# Patient Record
Sex: FEMALE | Race: WHITE | NOT HISPANIC OR LATINO | Employment: OTHER | URBAN - METROPOLITAN AREA
[De-identification: names, ages, dates, MRNs, and addresses within clinical notes are randomized per-mention and may not be internally consistent; named-entity substitution may affect disease eponyms.]

---

## 2017-03-28 ENCOUNTER — HOSPITAL ENCOUNTER (OUTPATIENT)
Dept: LAB | Facility: MEDICAL CENTER | Age: 62
End: 2017-03-28
Attending: FAMILY MEDICINE
Payer: COMMERCIAL

## 2017-03-28 LAB
ALBUMIN SERPL BCP-MCNC: 4 G/DL (ref 3.2–4.9)
ALBUMIN/GLOB SERPL: 1.3 G/DL
ALP SERPL-CCNC: 84 U/L (ref 30–99)
ALT SERPL-CCNC: 20 U/L (ref 2–50)
ANION GAP SERPL CALC-SCNC: 5 MMOL/L (ref 0–11.9)
AST SERPL-CCNC: 19 U/L (ref 12–45)
BILIRUB SERPL-MCNC: 0.4 MG/DL (ref 0.1–1.5)
BUN SERPL-MCNC: 17 MG/DL (ref 8–22)
CALCIUM SERPL-MCNC: 9.2 MG/DL (ref 8.5–10.5)
CHLORIDE SERPL-SCNC: 102 MMOL/L (ref 96–112)
CHOLEST SERPL-MCNC: 203 MG/DL (ref 100–199)
CO2 SERPL-SCNC: 30 MMOL/L (ref 20–33)
CREAT SERPL-MCNC: 0.73 MG/DL (ref 0.5–1.4)
ERYTHROCYTE [DISTWIDTH] IN BLOOD BY AUTOMATED COUNT: 48 FL (ref 35.9–50)
GLOBULIN SER CALC-MCNC: 3.1 G/DL (ref 1.9–3.5)
GLUCOSE SERPL-MCNC: 85 MG/DL (ref 65–99)
HCT VFR BLD AUTO: 44.8 % (ref 37–47)
HDLC SERPL-MCNC: 60 MG/DL
HGB BLD-MCNC: 15.2 G/DL (ref 12–16)
LDLC SERPL CALC-MCNC: 126 MG/DL
MCH RBC QN AUTO: 30.5 PG (ref 27–33)
MCHC RBC AUTO-ENTMCNC: 33.9 G/DL (ref 33.6–35)
MCV RBC AUTO: 90 FL (ref 81.4–97.8)
PLATELET # BLD AUTO: 293 K/UL (ref 164–446)
PMV BLD AUTO: 9.7 FL (ref 9–12.9)
POTASSIUM SERPL-SCNC: 3.8 MMOL/L (ref 3.6–5.5)
PROT SERPL-MCNC: 7.1 G/DL (ref 6–8.2)
RBC # BLD AUTO: 4.98 M/UL (ref 4.2–5.4)
SODIUM SERPL-SCNC: 137 MMOL/L (ref 135–145)
TRIGL SERPL-MCNC: 87 MG/DL (ref 0–149)
WBC # BLD AUTO: 7.6 K/UL (ref 4.8–10.8)

## 2017-03-28 PROCEDURE — 36415 COLL VENOUS BLD VENIPUNCTURE: CPT

## 2017-03-28 PROCEDURE — 80061 LIPID PANEL: CPT

## 2017-03-28 PROCEDURE — 80053 COMPREHEN METABOLIC PANEL: CPT

## 2017-03-28 PROCEDURE — 85027 COMPLETE CBC AUTOMATED: CPT

## 2017-06-27 ENCOUNTER — OFFICE VISIT (OUTPATIENT)
Dept: URGENT CARE | Facility: PHYSICIAN GROUP | Age: 62
End: 2017-06-27
Payer: COMMERCIAL

## 2017-06-27 VITALS
OXYGEN SATURATION: 98 % | RESPIRATION RATE: 18 BRPM | HEART RATE: 92 BPM | TEMPERATURE: 99.6 F | DIASTOLIC BLOOD PRESSURE: 90 MMHG | BODY MASS INDEX: 28.93 KG/M2 | WEIGHT: 180 LBS | SYSTOLIC BLOOD PRESSURE: 150 MMHG | HEIGHT: 66 IN

## 2017-06-27 DIAGNOSIS — H66.002 ACUTE SUPPURATIVE OTITIS MEDIA OF LEFT EAR WITHOUT SPONTANEOUS RUPTURE OF TYMPANIC MEMBRANE, RECURRENCE NOT SPECIFIED: ICD-10-CM

## 2017-06-27 PROCEDURE — 99204 OFFICE O/P NEW MOD 45 MIN: CPT | Performed by: FAMILY MEDICINE

## 2017-06-27 RX ORDER — AMOXICILLIN AND CLAVULANATE POTASSIUM 875; 125 MG/1; MG/1
1 TABLET, FILM COATED ORAL 2 TIMES DAILY
Qty: 14 TAB | Refills: 0 | Status: SHIPPED | OUTPATIENT
Start: 2017-06-27 | End: 2017-07-04

## 2017-06-27 ASSESSMENT — ENCOUNTER SYMPTOMS
DIZZINESS: 0
EYE PAIN: 0
CHILLS: 0
SORE THROAT: 0
NAUSEA: 0
HEADACHES: 1
VOMITING: 0
SHORTNESS OF BREATH: 0
MYALGIAS: 0
SINUS PRESSURE: 1
FEVER: 0

## 2017-06-27 NOTE — PROGRESS NOTES
"Subjective:      Wilda Oviedo is a 61 y.o. female who presents with Sinus Problem            Sinus Problem  This is a new problem. The current episode started in the past 7 days. The problem has been rapidly worsening since onset. The pain is severe. Associated symptoms include congestion, headaches and sinus pressure. Pertinent negatives include no chills, shortness of breath or sore throat.       Review of Systems   Constitutional: Negative for fever and chills.   HENT: Positive for congestion and sinus pressure. Negative for sore throat.    Eyes: Negative for pain.   Respiratory: Negative for shortness of breath.    Cardiovascular: Negative for chest pain.   Gastrointestinal: Negative for nausea and vomiting.   Genitourinary: Negative for hematuria.   Musculoskeletal: Negative for myalgias.   Skin: Negative for rash.   Neurological: Positive for headaches. Negative for dizziness.     PMH:  has a past medical history of Asthma.  MEDS:   Current outpatient prescriptions:   •  fluticasone-salmeterol (ADVAIR DISKUS) 100-50 MCG/DOSE AEROSOL POWDER, BREATH ACTIVATED, Inhale 1 Puff by mouth every 12 hours., Disp: , Rfl:   •  amoxicillin-clavulanate (AUGMENTIN) 875-125 MG Tab, Take 1 Tab by mouth 2 times a day for 7 days., Disp: 14 Tab, Rfl: 0  •  Multiple Vitamin (DAILY VITAMINS PO), Take  by mouth., Disp: , Rfl:   •  hydrocodone-acetaminophen (VICODIN) 5-500 MG TABS, Take 1-2 Tabs by mouth every four hours as needed for 20 doses., Disp: 20 Each, Rfl: 0  ALLERGIES: No Known Allergies  SURGHX:   Past Surgical History   Procedure Laterality Date   • Gyn surgery       tubes tied      SOCHX:  reports that she has never smoked. She has never used smokeless tobacco. She reports that she does not drink alcohol or use illicit drugs.  FH: family history is negative for Stroke and Heart Disease.      Objective:     /90 mmHg  Pulse 92  Temp(Src) 37.6 °C (99.6 °F)  Resp 18  Ht 1.664 m (5' 5.51\")  Wt 81.647 kg (180 lb) "  BMI 29.49 kg/m2  SpO2 98%     Physical Exam   Constitutional: She is oriented to person, place, and time. She appears well-developed and well-nourished. No distress.   HENT:   Head: Normocephalic and atraumatic.   Nose: Mucosal edema and rhinorrhea present. Right sinus exhibits maxillary sinus tenderness. Left sinus exhibits maxillary sinus tenderness.   Eyes: Conjunctivae and EOM are normal. Pupils are equal, round, and reactive to light.   Cardiovascular: Normal rate, regular rhythm, normal heart sounds and intact distal pulses.    No murmur heard.  Pulmonary/Chest: Effort normal and breath sounds normal. No respiratory distress.   Abdominal: Soft. Bowel sounds are normal. She exhibits no distension. There is no tenderness.   Musculoskeletal: Normal range of motion.   Neurological: She is alert and oriented to person, place, and time. She has normal reflexes. No sensory deficit.   Skin: Skin is warm and dry.   Psychiatric: She has a normal mood and affect. Her behavior is normal.               Assessment/Plan:     1. Acute suppurative otitis media of left ear without spontaneous rupture of tympanic membrane, recurrence not specified  Differential diagnosis, natural history, supportive care, and indications for immediate follow-up discussed.   - amoxicillin-clavulanate (AUGMENTIN) 875-125 MG Tab; Take 1 Tab by mouth 2 times a day for 7 days.  Dispense: 14 Tab; Refill: 0

## 2017-06-27 NOTE — MR AVS SNAPSHOT
"        Wilda Oviedo   2017 1:30 PM   Office Visit   MRN: [id number]    Department:  [facility]   Dept Phone:  [phone]    Description:  Female : 1955   Provider:  Jamey Mullen M.D.           Reason for Visit     Sinus Problem poss sinus infection, L ear pain, cough      Allergies as of 2017     No Known Allergies      You were diagnosed with     Acute suppurative otitis media of left ear without spontaneous rupture of tympanic membrane, recurrence not specified   [[id number]]         Vital Signs     Blood Pressure Pulse Temperature Respirations Height Weight    150/90 mmHg 92 37.6 °C (99.6 °F) 18 1.664 m (5' 5.51\") 81.647 kg (180 lb)    Body Mass Index Oxygen Saturation Smoking Status             29.49 kg/m2 98% Never Smoker          Basic Information     Date Of Birth Sex Race Ethnicity Preferred Language    1955 Female White Unknown English      Problem List              ICD-10-CM Priority Class Noted - Resolved    Hypothyroid E03.9   11/10/2009 - Present    Thyroid nodule E04.1   2010 - Present      Health Maintenance     Patient has no pending health maintenance at this time      Current Immunizations     Influenza Vaccine Quad Inj (Pf) 2015  6:36 AM, 2014      Below and/or attached are the medications your provider expects you to take. Review all of your home medications and newly ordered medications with your provider and/or pharmacist. Follow medication instructions as directed by your provider and/or pharmacist. Please keep your medication list with you and share with your provider. Update the information when medications are discontinued, doses are changed, or new medications (including over-the-counter products) are added; and carry medication information at all times in the event of emergency situations     Allergies:  No Known Allergies          Medications  Valid as of: 2017 -  1:57 PM    Generic Name Brand Name Tablet Size Instructions for " use    Amoxicillin-Pot Clavulanate (Tab) AUGMENTIN 875-125 MG Take 1 Tab by mouth 2 times a day for 7 days.        Fluticasone-Salmeterol (AEROSOL POWDER, BREATH ACTIVATED) ADVAIR 100-50 MCG/DOSE Inhale 1 Puff by mouth every 12 hours.        Hydrocodone-Acetaminophen (Tab) VICODIN 5-500 MG Take 1-2 Tabs by mouth every four hours as needed for 20 doses.        Multiple Vitamin   Take  by mouth.        .                 Medicines prescribed today were sent to:     [facility] [place] - [address] [facility]    [address] [place] [address]    Phone: [phone] Fax: [phone]    Open 24 Hours?: No      Medication refill instructions:       If your prescription bottle indicates you have medication refills left, it is not necessary to call your provider’s office. Please contact your pharmacy and they will refill your medication.    If your prescription bottle indicates you do not have any refills left, you may request refills at any time through one of the following ways: The online [employer] system (except Urgent Care), by calling your provider’s office, or by asking your pharmacy to contact your provider’s office with a refill request. Medication refills are processed only during regular business hours and may not be available until the next business day. Your provider may request additional information or to have a follow-up visit with you prior to refilling your medication.   *Please Note: Medication refills are assigned a new Rx number when refilled electronically. Your pharmacy may indicate that no refills were authorized even though a new prescription for the same medication is available at the pharmacy. Please request the medicine by name with the pharmacy before contacting your provider for a refill.        Instructions    Sinusitis, Adult  Sinusitis is redness, soreness, and inflammation of the paranasal sinuses. Paranasal sinuses are air pockets within the bones of your face. They are  located beneath your eyes, in the middle of your forehead, and above your eyes. In healthy paranasal sinuses, mucus is able to drain out, and air is able to circulate through them by way of your nose. However, when your paranasal sinuses are inflamed, mucus and air can become trapped. This can allow bacteria and other germs to grow and cause infection.  Sinusitis can develop quickly and last only a short time (acute) or continue over a long period (chronic). Sinusitis that lasts for more than 12 weeks is considered chronic.  CAUSES  Causes of sinusitis include:  · Allergies.  · Structural abnormalities, such as displacement of the cartilage that separates your nostrils (deviated septum), which can decrease the air flow through your nose and sinuses and affect sinus drainage.  · Functional abnormalities, such as when the small hairs (cilia) that line your sinuses and help remove mucus do not work properly or are not present.  SIGNS AND SYMPTOMS  Symptoms of acute and chronic sinusitis are the same. The primary symptoms are pain and pressure around the affected sinuses. Other symptoms include:  · Upper toothache.  · Earache.  · Headache.  · Bad breath.  · Decreased sense of smell and taste.  · A cough, which worsens when you are lying flat.  · Fatigue.  · Fever.  · Thick drainage from your nose, which often is green and may contain pus (purulent).  · Swelling and warmth over the affected sinuses.  DIAGNOSIS  Your health care provider will perform a physical exam. During your exam, your health care provider may perform any of the following to help determine if you have acute sinusitis or chronic sinusitis:  · Look in your nose for signs of abnormal growths in your nostrils (nasal polyps).  · Tap over the affected sinus to check for signs of infection.  · View the inside of your sinuses using an imaging device that has a light attached (endoscope).  If your health care provider suspects that you have chronic sinusitis,  one or more of the following tests may be recommended:  · Allergy tests.  · Nasal culture. A sample of mucus is taken from your nose, sent to a lab, and screened for bacteria.  · Nasal cytology. A sample of mucus is taken from your nose and examined by your health care provider to determine if your sinusitis is related to an allergy.  TREATMENT  Most cases of acute sinusitis are related to a viral infection and will resolve on their own within 10 days. Sometimes, medicines are prescribed to help relieve symptoms of both acute and chronic sinusitis. These may include pain medicines, decongestants, nasal steroid sprays, or saline sprays.  However, for sinusitis related to a bacterial infection, your health care provider will prescribe antibiotic medicines. These are medicines that will help kill the bacteria causing the infection.  Rarely, sinusitis is caused by a fungal infection. In these cases, your health care provider will prescribe antifungal medicine.  For some cases of chronic sinusitis, surgery is needed. Generally, these are cases in which sinusitis recurs more than 3 times per year, despite other treatments.  HOME CARE INSTRUCTIONS  · Drink plenty of water. Water helps thin the mucus so your sinuses can drain more easily.  · Use a humidifier.  · Inhale steam 3-4 times a day (for example, sit in the bathroom with the shower running).  · Apply a warm, moist washcloth to your face 3-4 times a day, or as directed by your health care provider.  · Use saline nasal sprays to help moisten and clean your sinuses.  · Take medicines only as directed by your health care provider.  · If you were prescribed either an antibiotic or antifungal medicine, finish it all even if you start to feel better.  SEEK IMMEDIATE MEDICAL CARE IF:  · You have increasing pain or severe headaches.  · You have nausea, vomiting, or drowsiness.  · You have swelling around your face.  · You have vision problems.  · You have a stiff  neck.  · You have difficulty breathing.     This information is not intended to replace advice given to you by your health care provider. Make sure you discuss any questions you have with your health care provider.     Document Released: 12/18/2006 Document Revised: 01/08/2016 Document Reviewed: 01/01/2013  [employer] Interactive Patient Education ©2016 Elsevier Inc.            [employer] Access Code: [email or web address]  Expires: 6/30/2017  4:38 AM    [employer]  A secure, online tool to manage your health information     [employer]’s [employer]® is a secure, online tool that connects you to your personalized health information from the privacy of your home -- day or night - making it very easy for you to manage your healthcare. Once the activation process is completed, you can even access your medical information using the [employer] maddie, which is available for free in the Apple Maddie store or Google Play store.     [employer] provides the following levels of access (as shown below):   My Chart Features   [facility] Primary Care Doctor [facility]  Specialists [facility]  Urgent  Care Non-[facility]  Primary Care  Doctor   Email your healthcare team securely and privately 24/7 X X X    Manage appointments: schedule your next appointment; view details of past/upcoming appointments X      Request prescription refills. X      View recent personal medical records, including lab and immunizations X X X X   View health record, including health history, allergies, medications X X X X   Read reports about your outpatient visits, procedures, consult and ER notes X X X X   See your discharge summary, which is a recap of your hospital and/or ER visit that includes your diagnosis, lab results, and care plan. X X       How to register for [employer]:  1. Go to  [email or web address].  2. Click on the Sign Up Now box, which takes you to the New Member Sign Up page. You will need to provide the following information:  a. Enter your [employer] Access Code exactly  as it appears at the top of this page. (You will not need to use this code after you’ve completed the sign-up process. If you do not sign up before the expiration date, you must request a new code.)   b. Enter your date of birth.   c. Enter your home email address.   d. Click Submit, and follow the next screen’s instructions.  3. Create a [employer]igo ID. This will be your [employer] login ID and cannot be changed, so think of one that is secure and easy to remember.  4. Create a Vigo password. You can change your password at any time.  5. Enter your Password Reset Question and Answer. This can be used at a later time if you forget your password.   6. Enter your e-mail address. This allows you to receive e-mail notifications when new information is available in [employer]igo.  7. Click Sign Up. You can now view your health information.    For assistance activating your [employer] account, call [phone]

## 2017-09-12 ENCOUNTER — HOSPITAL ENCOUNTER (OUTPATIENT)
Facility: MEDICAL CENTER | Age: 62
End: 2017-09-12
Payer: COMMERCIAL

## 2017-09-12 LAB
ALBUMIN SERPL BCP-MCNC: 4.4 G/DL (ref 3.2–4.9)
ALBUMIN/GLOB SERPL: 1.5 G/DL
ALP SERPL-CCNC: 69 U/L (ref 30–99)
ALT SERPL-CCNC: 17 U/L (ref 2–50)
ANION GAP SERPL CALC-SCNC: 6 MMOL/L (ref 0–11.9)
AST SERPL-CCNC: 18 U/L (ref 12–45)
BDY FAT % MEASURED: 44.5 %
BILIRUB SERPL-MCNC: 0.6 MG/DL (ref 0.1–1.5)
BP DIAS: 90 MMHG
BP SYS: 136 MMHG
BUN SERPL-MCNC: 17 MG/DL (ref 8–22)
CALCIUM SERPL-MCNC: 9.6 MG/DL (ref 8.5–10.5)
CHLORIDE SERPL-SCNC: 104 MMOL/L (ref 96–112)
CHOLEST SERPL-MCNC: 221 MG/DL (ref 100–199)
CO2 SERPL-SCNC: 28 MMOL/L (ref 20–33)
CREAT SERPL-MCNC: 0.71 MG/DL (ref 0.5–1.4)
DIABETES HTDIA: NO
EVENT NAME HTEVT: NORMAL
GFR SERPL CREATININE-BSD FRML MDRD: >60 ML/MIN/1.73 M 2
GLOBULIN SER CALC-MCNC: 3 G/DL (ref 1.9–3.5)
GLUCOSE SERPL-MCNC: 87 MG/DL (ref 65–99)
HDLC SERPL-MCNC: 60 MG/DL
HYPERTENSION HTHYP: NO
LDLC SERPL CALC-MCNC: 142 MG/DL
POTASSIUM SERPL-SCNC: 3.9 MMOL/L (ref 3.6–5.5)
PROT SERPL-MCNC: 7.4 G/DL (ref 6–8.2)
SCREENING LOC CITY HTCIT: NORMAL
SCREENING LOC STATE HTSTA: NORMAL
SCREENING LOCATION HTLOC: NORMAL
SODIUM SERPL-SCNC: 138 MMOL/L (ref 135–145)
SUBSCRIBER ID HTSID: NORMAL
TRIGL SERPL-MCNC: 95 MG/DL (ref 0–149)

## 2017-10-13 ENCOUNTER — HOSPITAL ENCOUNTER (OUTPATIENT)
Dept: RADIOLOGY | Facility: MEDICAL CENTER | Age: 62
End: 2017-10-13
Attending: FAMILY MEDICINE
Payer: COMMERCIAL

## 2017-10-13 DIAGNOSIS — Z12.31 VISIT FOR SCREENING MAMMOGRAM: ICD-10-CM

## 2017-10-13 PROCEDURE — G0202 SCR MAMMO BI INCL CAD: HCPCS

## 2018-01-16 ENCOUNTER — HOSPITAL ENCOUNTER (OUTPATIENT)
Dept: LAB | Facility: MEDICAL CENTER | Age: 63
End: 2018-01-16
Attending: INTERNAL MEDICINE
Payer: COMMERCIAL

## 2018-01-16 LAB
ALBUMIN SERPL BCP-MCNC: 4.8 G/DL (ref 3.2–4.9)
ALBUMIN/GLOB SERPL: 1.8 G/DL
ALP SERPL-CCNC: 78 U/L (ref 30–99)
ALT SERPL-CCNC: 27 U/L (ref 2–50)
ANION GAP SERPL CALC-SCNC: 8 MMOL/L (ref 0–11.9)
AST SERPL-CCNC: 21 U/L (ref 12–45)
BASOPHILS # BLD AUTO: 0.8 % (ref 0–1.8)
BASOPHILS # BLD: 0.09 K/UL (ref 0–0.12)
BILIRUB SERPL-MCNC: 0.3 MG/DL (ref 0.1–1.5)
BUN SERPL-MCNC: 17 MG/DL (ref 8–22)
CALCIUM SERPL-MCNC: 9.9 MG/DL (ref 8.5–10.5)
CHLORIDE SERPL-SCNC: 104 MMOL/L (ref 96–112)
CO2 SERPL-SCNC: 29 MMOL/L (ref 20–33)
CREAT SERPL-MCNC: 0.71 MG/DL (ref 0.5–1.4)
EOSINOPHIL # BLD AUTO: 0.29 K/UL (ref 0–0.51)
EOSINOPHIL NFR BLD: 2.7 % (ref 0–6.9)
ERYTHROCYTE [DISTWIDTH] IN BLOOD BY AUTOMATED COUNT: 48.9 FL (ref 35.9–50)
FERRITIN SERPL-MCNC: 87.6 NG/ML (ref 10–291)
GLOBULIN SER CALC-MCNC: 2.6 G/DL (ref 1.9–3.5)
GLUCOSE SERPL-MCNC: 113 MG/DL (ref 65–99)
HCT VFR BLD AUTO: 46 % (ref 37–47)
HGB BLD-MCNC: 15.6 G/DL (ref 12–16)
IMM GRANULOCYTES # BLD AUTO: 0.03 K/UL (ref 0–0.11)
IMM GRANULOCYTES NFR BLD AUTO: 0.3 % (ref 0–0.9)
LYMPHOCYTES # BLD AUTO: 2.85 K/UL (ref 1–4.8)
LYMPHOCYTES NFR BLD: 26.9 % (ref 22–41)
MCH RBC QN AUTO: 30.5 PG (ref 27–33)
MCHC RBC AUTO-ENTMCNC: 33.9 G/DL (ref 33.6–35)
MCV RBC AUTO: 89.8 FL (ref 81.4–97.8)
MONOCYTES # BLD AUTO: 1.04 K/UL (ref 0–0.85)
MONOCYTES NFR BLD AUTO: 9.8 % (ref 0–13.4)
NEUTROPHILS # BLD AUTO: 6.31 K/UL (ref 2–7.15)
NEUTROPHILS NFR BLD: 59.5 % (ref 44–72)
NRBC # BLD AUTO: 0 K/UL
NRBC BLD-RTO: 0 /100 WBC
PLATELET # BLD AUTO: 311 K/UL (ref 164–446)
PMV BLD AUTO: 9.2 FL (ref 9–12.9)
POTASSIUM SERPL-SCNC: 3.7 MMOL/L (ref 3.6–5.5)
PROT SERPL-MCNC: 7.4 G/DL (ref 6–8.2)
RBC # BLD AUTO: 5.12 M/UL (ref 4.2–5.4)
SODIUM SERPL-SCNC: 141 MMOL/L (ref 135–145)
TSH SERPL DL<=0.005 MIU/L-ACNC: 4.89 UIU/ML (ref 0.38–5.33)
WBC # BLD AUTO: 10.6 K/UL (ref 4.8–10.8)

## 2018-01-16 PROCEDURE — 80053 COMPREHEN METABOLIC PANEL: CPT

## 2018-01-16 PROCEDURE — 82728 ASSAY OF FERRITIN: CPT

## 2018-01-16 PROCEDURE — 86664 EPSTEIN-BARR NUCLEAR ANTIGEN: CPT

## 2018-01-16 PROCEDURE — 86663 EPSTEIN-BARR ANTIBODY: CPT

## 2018-01-16 PROCEDURE — 85025 COMPLETE CBC W/AUTO DIFF WBC: CPT

## 2018-01-16 PROCEDURE — 84443 ASSAY THYROID STIM HORMONE: CPT

## 2018-01-16 PROCEDURE — 36415 COLL VENOUS BLD VENIPUNCTURE: CPT

## 2018-01-16 PROCEDURE — 82306 VITAMIN D 25 HYDROXY: CPT

## 2018-01-16 PROCEDURE — 86665 EPSTEIN-BARR CAPSID VCA: CPT | Mod: 91

## 2018-01-17 LAB — 25(OH)D3 SERPL-MCNC: 36 NG/ML (ref 30–100)

## 2018-01-18 LAB
EBV EA-D IGG SER-ACNC: 10 U/ML (ref 0–10.9)
EBV NA IGG SER IA-ACNC: 144 U/ML (ref 0–21.9)
EBV VCA IGG SER IA-ACNC: >750 U/ML (ref 0–21.9)
EBV VCA IGM SER IA-ACNC: <10 U/ML (ref 0–43.9)

## 2018-09-12 ENCOUNTER — HOSPITAL ENCOUNTER (OUTPATIENT)
Facility: MEDICAL CENTER | Age: 63
End: 2018-09-12
Payer: COMMERCIAL

## 2018-09-12 LAB
BDY FAT % MEASURED: 45 %
BP DIAS: 90 MMHG
BP SYS: 130 MMHG
DIABETES HTDIA: NO
EVENT NAME HTEVT: NORMAL
HYPERTENSION HTHYP: NO
SCREENING LOC CITY HTCIT: NORMAL
SCREENING LOC STATE HTSTA: NORMAL
SCREENING LOCATION HTLOC: NORMAL
SUBSCRIBER ID HTSID: NORMAL

## 2018-09-13 LAB
CHOLEST SERPL-MCNC: 200 MG/DL (ref 100–199)
FASTING STATUS PATIENT QL REPORTED: NORMAL
GLUCOSE SERPL-MCNC: 86 MG/DL (ref 65–99)
HDLC SERPL-MCNC: 56 MG/DL
LDLC SERPL CALC-MCNC: 127 MG/DL
TRIGL SERPL-MCNC: 85 MG/DL (ref 0–149)

## 2018-10-18 ENCOUNTER — HOSPITAL ENCOUNTER (OUTPATIENT)
Dept: RADIOLOGY | Facility: MEDICAL CENTER | Age: 63
End: 2018-10-18
Attending: FAMILY MEDICINE
Payer: COMMERCIAL

## 2018-10-18 DIAGNOSIS — Z12.31 VISIT FOR SCREENING MAMMOGRAM: ICD-10-CM

## 2018-10-18 PROCEDURE — 77067 SCR MAMMO BI INCL CAD: CPT

## 2018-12-11 ENCOUNTER — HOSPITAL ENCOUNTER (OUTPATIENT)
Dept: RADIOLOGY | Facility: MEDICAL CENTER | Age: 63
End: 2018-12-11
Attending: ORTHOPAEDIC SURGERY
Payer: COMMERCIAL

## 2018-12-11 DIAGNOSIS — M54.2 CERVICALGIA: ICD-10-CM

## 2018-12-11 PROCEDURE — 72141 MRI NECK SPINE W/O DYE: CPT

## 2019-02-22 ENCOUNTER — HOSPITAL ENCOUNTER (OUTPATIENT)
Dept: LAB | Facility: MEDICAL CENTER | Age: 64
End: 2019-02-22
Attending: FAMILY MEDICINE
Payer: COMMERCIAL

## 2019-02-22 PROCEDURE — 82306 VITAMIN D 25 HYDROXY: CPT

## 2019-02-22 PROCEDURE — 84439 ASSAY OF FREE THYROXINE: CPT

## 2019-02-22 PROCEDURE — 80053 COMPREHEN METABOLIC PANEL: CPT

## 2019-02-22 PROCEDURE — 80061 LIPID PANEL: CPT

## 2019-02-22 PROCEDURE — 36415 COLL VENOUS BLD VENIPUNCTURE: CPT

## 2019-02-22 PROCEDURE — 84443 ASSAY THYROID STIM HORMONE: CPT

## 2019-02-23 LAB
25(OH)D3 SERPL-MCNC: 33 NG/ML (ref 30–100)
ALBUMIN SERPL BCP-MCNC: 4.5 G/DL (ref 3.2–4.9)
ALBUMIN/GLOB SERPL: 1.9 G/DL
ALP SERPL-CCNC: 79 U/L (ref 30–99)
ALT SERPL-CCNC: 20 U/L (ref 2–50)
ANION GAP SERPL CALC-SCNC: 7 MMOL/L (ref 0–11.9)
AST SERPL-CCNC: 21 U/L (ref 12–45)
BILIRUB SERPL-MCNC: 0.4 MG/DL (ref 0.1–1.5)
BUN SERPL-MCNC: 14 MG/DL (ref 8–22)
CALCIUM SERPL-MCNC: 9.7 MG/DL (ref 8.5–10.5)
CHLORIDE SERPL-SCNC: 102 MMOL/L (ref 96–112)
CHOLEST SERPL-MCNC: 231 MG/DL (ref 100–199)
CO2 SERPL-SCNC: 30 MMOL/L (ref 20–33)
CREAT SERPL-MCNC: 0.69 MG/DL (ref 0.5–1.4)
FASTING STATUS PATIENT QL REPORTED: NORMAL
GLOBULIN SER CALC-MCNC: 2.4 G/DL (ref 1.9–3.5)
GLUCOSE SERPL-MCNC: 83 MG/DL (ref 65–99)
HDLC SERPL-MCNC: 53 MG/DL
LDLC SERPL CALC-MCNC: 161 MG/DL
POTASSIUM SERPL-SCNC: 4.2 MMOL/L (ref 3.6–5.5)
PROT SERPL-MCNC: 6.9 G/DL (ref 6–8.2)
SODIUM SERPL-SCNC: 139 MMOL/L (ref 135–145)
T4 FREE SERPL-MCNC: 1.12 NG/DL (ref 0.53–1.43)
TRIGL SERPL-MCNC: 85 MG/DL (ref 0–149)
TSH SERPL DL<=0.005 MIU/L-ACNC: 4.38 UIU/ML (ref 0.38–5.33)

## 2019-07-06 ENCOUNTER — OFFICE VISIT (OUTPATIENT)
Dept: URGENT CARE | Facility: PHYSICIAN GROUP | Age: 64
End: 2019-07-06
Payer: COMMERCIAL

## 2019-07-06 ENCOUNTER — HOSPITAL ENCOUNTER (OUTPATIENT)
Facility: MEDICAL CENTER | Age: 64
End: 2019-07-06
Attending: FAMILY MEDICINE
Payer: COMMERCIAL

## 2019-07-06 VITALS
SYSTOLIC BLOOD PRESSURE: 120 MMHG | OXYGEN SATURATION: 95 % | WEIGHT: 184 LBS | HEART RATE: 96 BPM | TEMPERATURE: 97.8 F | BODY MASS INDEX: 29.57 KG/M2 | DIASTOLIC BLOOD PRESSURE: 90 MMHG | HEIGHT: 66 IN

## 2019-07-06 DIAGNOSIS — J02.9 PHARYNGITIS, UNSPECIFIED ETIOLOGY: ICD-10-CM

## 2019-07-06 LAB
INT CON NEG: NEGATIVE
INT CON POS: POSITIVE
S PYO AG THROAT QL: NEGATIVE

## 2019-07-06 PROCEDURE — 87880 STREP A ASSAY W/OPTIC: CPT | Performed by: FAMILY MEDICINE

## 2019-07-06 PROCEDURE — 99214 OFFICE O/P EST MOD 30 MIN: CPT | Performed by: FAMILY MEDICINE

## 2019-07-06 PROCEDURE — 87070 CULTURE OTHR SPECIMN AEROBIC: CPT

## 2019-07-06 RX ORDER — AMOXICILLIN 500 MG/1
500 CAPSULE ORAL 3 TIMES DAILY
Qty: 30 CAP | Refills: 0 | Status: SHIPPED | OUTPATIENT
Start: 2019-07-06 | End: 2020-10-21

## 2019-07-06 ASSESSMENT — ENCOUNTER SYMPTOMS
SHORTNESS OF BREATH: 0
VOMITING: 0
CHILLS: 0
SORE THROAT: 1
HEADACHES: 0
FEVER: 0

## 2019-07-06 NOTE — PROGRESS NOTES
Subjective:     Wilda Oviedo is a 63 y.o. female who presents for Swollen Glands (pustules in back of throat, hurts to touch throat, cough with thick yellow phlegm)    HPI  Pt presents for evaluation of a new problem   Pt with a sore throat   Has swollen lymph nodes in neck which are painful   Has been ill for 3 days  Gargling with salt water and not helping   Pain in posterior pharynx is constant, worse with swallowing, and sometimes causes her to choke  Has very occasional coughing which she attributes to drainage down the back of the throat  Pain equal bilaterally and does not radiate  Has noticed white spots in the back of her throat    Review of Systems   Constitutional: Negative for chills and fever.   HENT: Positive for sore throat.    Respiratory: Negative for shortness of breath.    Cardiovascular: Negative for chest pain.   Gastrointestinal: Negative for vomiting.   Skin: Negative for rash.   Neurological: Negative for headaches.       PMH:  has a past medical history of Asthma.  MEDS:   Current Outpatient Prescriptions:   •  fluticasone-salmeterol (ADVAIR DISKUS) 100-50 MCG/DOSE AEROSOL POWDER, BREATH ACTIVATED, Inhale 1 Puff by mouth every 12 hours., Disp: , Rfl:   •  Multiple Vitamin (DAILY VITAMINS PO), Take  by mouth., Disp: , Rfl:   •  hydrocodone-acetaminophen (VICODIN) 5-500 MG TABS, Take 1-2 Tabs by mouth every four hours as needed for 20 doses. (Patient not taking: Reported on 7/6/2019), Disp: 20 Each, Rfl: 0  ALLERGIES: No Known Allergies  SURGHX:   Past Surgical History:   Procedure Laterality Date   • GYN SURGERY      tubes tied      SOCHX:  reports that she has never smoked. She has never used smokeless tobacco. She reports that she does not drink alcohol or use drugs.  FH: Family history was reviewed, not contributing to acute illness     Objective:   /90 (BP Location: Left arm, Patient Position: Sitting, BP Cuff Size: Adult)   Pulse 96   Temp 36.6 °C (97.8 °F) (Temporal)   Ht  "1.664 m (5' 5.51\")   Wt 83.5 kg (184 lb)   SpO2 95%   BMI 30.14 kg/m²     Physical Exam   Constitutional: She is oriented to person, place, and time. She appears well-developed and well-nourished. No distress.   HENT:   Head: Normocephalic and atraumatic.   Right Ear: Tympanic membrane, external ear and ear canal normal.   Left Ear: Tympanic membrane, external ear and ear canal normal.   Nose: Nose normal.   Posterior pharynx moderately erythematous with multiple white patches, no unilateral swelling, no uvular deviation   Eyes: Conjunctivae and EOM are normal. Right eye exhibits no discharge. Left eye exhibits no discharge. No scleral icterus.   Neck: Normal range of motion. No tracheal deviation present.   Cardiovascular: Normal rate and regular rhythm.    Pulmonary/Chest: Effort normal and breath sounds normal. No respiratory distress. She has no wheezes. She has no rales.   Neurological: She is alert and oriented to person, place, and time.   Skin: Skin is warm and dry. She is not diaphoretic.   Psychiatric: She has a normal mood and affect. Her behavior is normal. Judgment and thought content normal.       Assessment/Plan:   Assessment    1. Pharyngitis, unspecified etiology  Patient is a 63-year-old female with pharyngitis for the past 3 days.  Rapid strep negative.  Advised that etiology could be viral versus bacterial versus fungal.  Based on appearance, would favor bacterial and high concern for non-group A strep.  Patient prefers to empirically treat with antibiotics while awaiting throat culture results since the area is so painful.  Will follow throat culture results.  - POCT Rapid Strep A  - amoxicillin (AMOXIL) 500 MG Cap; Take 1 Cap by mouth 3 times a day.  Dispense: 30 Cap; Refill: 0  - CULTURE THROAT; Future        "

## 2019-07-07 DIAGNOSIS — J02.9 PHARYNGITIS, UNSPECIFIED ETIOLOGY: ICD-10-CM

## 2019-07-08 ENCOUNTER — PATIENT MESSAGE (OUTPATIENT)
Dept: MEDICAL GROUP | Facility: CLINIC | Age: 64
End: 2019-07-08

## 2019-07-10 LAB
BACTERIA SPEC RESP CULT: NORMAL
SIGNIFICANT IND 70042: NORMAL
SITE SITE: NORMAL
SOURCE SOURCE: NORMAL

## 2019-10-24 ENCOUNTER — APPOINTMENT (OUTPATIENT)
Dept: RADIOLOGY | Facility: MEDICAL CENTER | Age: 64
End: 2019-10-24
Attending: FAMILY MEDICINE
Payer: COMMERCIAL

## 2019-10-29 ENCOUNTER — HOSPITAL ENCOUNTER (OUTPATIENT)
Dept: RADIOLOGY | Facility: MEDICAL CENTER | Age: 64
End: 2019-10-29
Attending: FAMILY MEDICINE
Payer: COMMERCIAL

## 2019-10-29 DIAGNOSIS — Z12.31 SCREENING MAMMOGRAM, ENCOUNTER FOR: ICD-10-CM

## 2019-10-29 PROCEDURE — 77063 BREAST TOMOSYNTHESIS BI: CPT

## 2019-11-21 ENCOUNTER — HOSPITAL ENCOUNTER (OUTPATIENT)
Dept: RADIOLOGY | Facility: MEDICAL CENTER | Age: 64
End: 2019-11-21
Attending: FAMILY MEDICINE
Payer: COMMERCIAL

## 2019-11-21 DIAGNOSIS — Z13.820 ENCOUNTER FOR IMAGING TO ASSESS OSTEOPOROSIS: ICD-10-CM

## 2019-11-21 PROCEDURE — 77080 DXA BONE DENSITY AXIAL: CPT

## 2019-12-20 ENCOUNTER — HOSPITAL ENCOUNTER (OUTPATIENT)
Dept: PULMONOLOGY | Facility: MEDICAL CENTER | Age: 64
End: 2019-12-20
Attending: FAMILY MEDICINE
Payer: COMMERCIAL

## 2019-12-20 PROCEDURE — 94060 EVALUATION OF WHEEZING: CPT

## 2019-12-20 PROCEDURE — 94060 EVALUATION OF WHEEZING: CPT | Mod: 26 | Performed by: INTERNAL MEDICINE

## 2019-12-20 PROCEDURE — 94726 PLETHYSMOGRAPHY LUNG VOLUMES: CPT

## 2019-12-20 PROCEDURE — 94729 DIFFUSING CAPACITY: CPT | Mod: 26 | Performed by: INTERNAL MEDICINE

## 2019-12-20 PROCEDURE — 94729 DIFFUSING CAPACITY: CPT

## 2019-12-20 PROCEDURE — 94726 PLETHYSMOGRAPHY LUNG VOLUMES: CPT | Mod: 26 | Performed by: INTERNAL MEDICINE

## 2019-12-20 ASSESSMENT — PULMONARY FUNCTION TESTS
FEV1_PREDICTED: 2.46
FEV1/FVC: 85.26
FVC_LLN: 2.63
FEV1/FVC_PERCENT_PREDICTED: 106
FEV1_PERCENT_CHANGE: 0
FEV1/FVC_PERCENT_LLN: 65.74
FEV1/FVC: 84
FVC_PERCENT_PREDICTED: 79
FEV1/FVC: 84.05
FEV1_PERCENT_PREDICTED: 84
FEV1/FVC_PERCENT_CHANGE: 1
FEV1_LLN: 2.06
FEV1_LLN: 2.06
FEV1/FVC_PERCENT_PREDICTED: 109
FEV1/FVC_PERCENT_PREDICTED: 108
FVC: 2.48
FVC: 2.51
FEV1/FVC_PERCENT_LLN: 65.74
FVC_LLN: 2.63
FEV1/FVC_PERCENT_PREDICTED: 108
FEV1_PERCENT_CHANGE: 2
FVC_PREDICTED: 3.15
FEV1/FVC_PERCENT_PREDICTED: 78
FEV1/FVC_PREDICTED: 78.74
FEV1: 2.14
FEV1/FVC: 85.26
FEV1: 2.09
FVC_PERCENT_PREDICTED: 78
FEV1_PERCENT_PREDICTED: 86

## 2019-12-23 NOTE — PROCEDURES
PULMONARY FUNCTION TEST INTERPRETATION    DATE OF SERVICE:  12/20/2019    INTERPRETING PROVIDER:  Myke Aguayo III, MD    RESULTS:  SPIROMETRY:  1.  FVC pre-bronchodilator 2.48 liters, 78% predicted, post-bronchodilator   2.51 liters, 79% predicted.  2.  FEV1 2.09 liters, 84% predicted, post-bronchodilator 2.14 liters, 86%   predicted.  3.  FEV1/FVC 84%.    LUNG VOLUMES:  1.  TLC 4.97 liters, 95% predicted.  2.  ERV 0.24 liters, 26% predicted.    DIFFUSION CAPACITY:  1.  DLCO corrected for hemoglobin 128% predicted.  2.  DL/% predicted.    INTERPRETATION:  1.  There is no significant obstructive ventilatory defect on spirometry.  2.  There is no significant restrictive ventilatory defect seen on lung   volumes.  The reduced ERV is likely related to body habitus.  3.  Diffusion capacity is normal.  4.  There are no prior PFTs for comparison.       ____________________________________     Myke Aguayo III, MD    [facility]    DD:  12/22/2019 22:13:36  DT:  12/22/2019 23:01:17    D#:  [id number]  Job#:  [id number]

## 2020-01-15 ENCOUNTER — OFFICE VISIT (OUTPATIENT)
Dept: PULMONOLOGY | Facility: HOSPICE | Age: 65
End: 2020-01-15
Payer: COMMERCIAL

## 2020-01-15 VITALS
HEART RATE: 82 BPM | TEMPERATURE: 98.6 F | SYSTOLIC BLOOD PRESSURE: 124 MMHG | BODY MASS INDEX: 29.02 KG/M2 | OXYGEN SATURATION: 96 % | WEIGHT: 170 LBS | DIASTOLIC BLOOD PRESSURE: 88 MMHG | HEIGHT: 64 IN | RESPIRATION RATE: 16 BRPM

## 2020-01-15 DIAGNOSIS — J45.909 ASTHMA, UNSPECIFIED ASTHMA SEVERITY, UNSPECIFIED WHETHER COMPLICATED, UNSPECIFIED WHETHER PERSISTENT: ICD-10-CM

## 2020-01-15 DIAGNOSIS — Z87.891 HISTORY OF TOBACCO USE: ICD-10-CM

## 2020-01-15 PROCEDURE — 99203 OFFICE O/P NEW LOW 30 MIN: CPT | Performed by: INTERNAL MEDICINE

## 2020-01-15 RX ORDER — ALBUTEROL SULFATE 90 UG/1
2 AEROSOL, METERED RESPIRATORY (INHALATION) EVERY 6 HOURS PRN
COMMUNITY

## 2020-01-15 SDOH — HEALTH STABILITY: MENTAL HEALTH: HOW MANY STANDARD DRINKS CONTAINING ALCOHOL DO YOU HAVE ON A TYPICAL DAY?: 1 OR 2

## 2020-01-15 SDOH — HEALTH STABILITY: MENTAL HEALTH: HOW OFTEN DO YOU HAVE 6 OR MORE DRINKS ON ONE OCCASION?: LESS THAN MONTHLY

## 2020-01-15 SDOH — HEALTH STABILITY: MENTAL HEALTH: HOW OFTEN DO YOU HAVE A DRINK CONTAINING ALCOHOL?: MONTHLY OR LESS

## 2020-01-15 ASSESSMENT — ENCOUNTER SYMPTOMS
WEAKNESS: 0
DIZZINESS: 0
PND: 0
FOCAL WEAKNESS: 0
SPUTUM PRODUCTION: 0
DIARRHEA: 0
ABDOMINAL PAIN: 0
CLAUDICATION: 0
STRIDOR: 0
NECK PAIN: 0
PHOTOPHOBIA: 0
SORE THROAT: 0
HEARTBURN: 0
DIAPHORESIS: 0
ORTHOPNEA: 0
HEADACHES: 0
DEPRESSION: 0
SINUS PAIN: 0
FALLS: 0
EYE REDNESS: 0
TREMORS: 0
CHILLS: 0
BLURRED VISION: 0
EYE DISCHARGE: 0
SPEECH CHANGE: 0
PALPITATIONS: 0
FEVER: 0
CONSTIPATION: 0
WEIGHT LOSS: 0
NAUSEA: 0
VOMITING: 0
COUGH: 0
SHORTNESS OF BREATH: 0
BACK PAIN: 0
DOUBLE VISION: 0
HEMOPTYSIS: 0
EYE PAIN: 0
MYALGIAS: 0
WHEEZING: 0

## 2020-01-15 NOTE — PROGRESS NOTES
Chief Complaint   Patient presents with   • Establish Care     referral 11/11/19 DO ARSENIO Verma moderate persistent asthma    • Results     PFT 12/20/19, sang at [facility] 11/11/19        HPI: This patient is a 64 y.o. female presenting for evaluation of asthma.  The patient's past medical history is significant for seasonal allergic rhinitis for which she gets a Kenalog shot in the spring every other year and a diagnosis of asthma made roughly 5 years ago mainly exercise-induced.  Her regimen initially included Advair 250 with short acting bronchodilators or pro-air as needed.  She was well controlled on this until the past year when she was switched to Breo due to insurance coverage.  In the fall 2019 she started an exercise regimen hiking daily near her home which caused her to need her rescue bronchodilator at least once if not twice daily.  Then into the fall she developed cough, wheezing and shortness of breath and was having increased use of her pro-air.  She was seen by her primary care provider in November at which point spirometry showed decrease in FEV1 with mild airflow obstruction.  She was treated with prednisone and formal pulmonary function testing was ordered.  She was also transitioned from Breo to Trelegy.  Her symptoms resolved after treatment with prednisone and pulmonary function testing from December 20, 2019 shows normal airflows, normal lung volumes and elevated DLCO more consistent with a diagnosis of asthma.  She is currently still exercising but does not need her pro-air on a daily basis.  She has taken over-the-counter, second-generation histamines for her allergies in the past without significant relief and therefore has continued the Kenalog injections every other year.  Patient is a former tobacco user with roughly 20-pack-year history but quit at the age of 20.  There is no family history of atopic or autoimmune disease.  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    Past Medical History:   Diagnosis Date   • Asthma        Social History     Socioeconomic History   • Marital status:      Spouse name: Not on file   • Number of children: Not on file   • Years of education: Not on file   • Highest education level: Not on file   Occupational History   • Not on file   Social Needs   • Financial resource strain: Not on file   • Food insecurity:     Worry: Not on file     Inability: Not on file   • Transportation needs:     Medical: Not on file     Non-medical: Not on file   Tobacco Use   • Smoking status: Former Smoker     Packs/day: 2.00     Years: 10.00     Pack years: 20.00     Types: Cigarettes     Last attempt to quit:      Years since quittin.0   • Smokeless tobacco: Never Used   Substance and Sexual Activity   • Alcohol use: Yes     Alcohol/week: 1.2 - 1.8 oz     Types: 2 - 3 Glasses of wine per week     Frequency: Monthly or less     Drinks per session: 1 or 2     Binge frequency: Less than monthly   • Drug use: No   • Sexual activity: Not on file   Lifestyle   • Physical activity:     Days per week: Not on file     Minutes per session: Not on file   • Stress: Not on file   Relationships   • Social connections:     Talks on phone: Not on file     Gets together: Not on file     Attends [employer] service: Not on file     Active member of club or organization: Not on file     Attends meetings of clubs or organizations: Not on file     Relationship status: Not on file   • Intimate partner violence:     Fear of current or ex partner: Not on file     Emotionally abused: Not on file     Physically abused: Not on file     Forced sexual activity: Not on file   Other Topics Concern   • Not on file   Social History Narrative   • Not on file       Family History   Problem Relation Age of Onset   • COPD Mother    • No Known Problems Father    • Stroke Neg Hx    • Heart Disease Neg Hx        Current Outpatient Medications on  File Prior to Visit   Medication Sig Dispense Refill   • albuterol (PROAIR HFA) 108 (90 Base) MCG/ACT Aero Soln inhalation aerosol Inhale 2 Puffs by mouth every 6 hours as needed for Shortness of Breath.     • CALCIUM PO Take 1,000 mg by mouth every day.     • B Complex Vitamins (VITAMIN B COMPLEX PO) Take 50 mg by mouth every day.     • Cholecalciferol (VITAMIN D3) 5000 units Cap Take 1 Cap by mouth every day.     • 5-Hydroxytryptophan (5-HTP PO) Take 2 Caps by mouth every day.     • Multiple Vitamin (DAILY VITAMINS PO) Take  by mouth.     • amoxicillin (AMOXIL) 500 MG Cap Take 1 Cap by mouth 3 times a day. (Patient not taking: Reported on 1/15/2020) 30 Cap 0   • hydrocodone-acetaminophen (VICODIN) 5-500 MG TABS Take 1-2 Tabs by mouth every four hours as needed for 20 doses. (Patient not taking: Reported on 7/6/2019) 20 Each 0     No current facility-administered medications on file prior to visit.        Allergies: Patient has no known allergies.    ROS:   Review of Systems   Constitutional: Negative for chills, diaphoresis, fever, malaise/fatigue and weight loss.   HENT: Negative for congestion, ear discharge, ear pain, hearing loss, nosebleeds, sinus pain, sore throat and tinnitus.    Eyes: Negative for blurred vision, double vision, photophobia, pain, discharge and redness.   Respiratory: Negative for cough, hemoptysis, sputum production, shortness of breath, wheezing and stridor.    Cardiovascular: Negative for chest pain, palpitations, orthopnea, claudication, leg swelling and PND.   Gastrointestinal: Negative for abdominal pain, constipation, diarrhea, heartburn, nausea and vomiting.   Genitourinary: Negative for dysuria and urgency.   Musculoskeletal: Negative for back pain, falls, joint pain, myalgias and neck pain.   Skin: Negative for itching and rash.   Neurological: Negative for dizziness, tremors, speech change, focal weakness, weakness and headaches.   Endo/Heme/Allergies: Negative for environmental  "allergies.   Psychiatric/Behavioral: Negative for depression.       /88 (BP Location: Left arm, Patient Position: Sitting, BP Cuff Size: Adult)   Pulse 82   Temp 37 °C (98.6 °F) (Temporal)   Resp 16   Ht 1.613 m (5' 3.5\")   Wt 77.1 kg (170 lb)   SpO2 96%     Physical Exam:  Physical Exam  Constitutional:       General: She is not in acute distress.     Appearance: Normal appearance. She is well-developed.   HENT:      Head: Normocephalic and atraumatic.      Right Ear: External ear normal.      Left Ear: External ear normal.      Nose: Nose normal. No congestion.      Mouth/Throat:      Mouth: Mucous membranes are moist.      Pharynx: Oropharynx is clear. No oropharyngeal exudate.   Eyes:      General: No scleral icterus.     Extraocular Movements: Extraocular movements intact.      Conjunctiva/sclera: Conjunctivae normal.      Pupils: Pupils are equal, round, and reactive to light.   Neck:      Musculoskeletal: Neck supple.      Vascular: No JVD.      Trachea: No tracheal deviation.   Cardiovascular:      Rate and Rhythm: Normal rate and regular rhythm.      Heart sounds: Normal heart sounds. No murmur. No friction rub. No gallop.    Pulmonary:      Effort: Pulmonary effort is normal. No accessory muscle usage or respiratory distress.      Breath sounds: Normal breath sounds. No wheezing or rales.   Abdominal:      General: There is no distension.      Palpations: Abdomen is soft.      Tenderness: There is no tenderness.   Musculoskeletal: Normal range of motion.         General: No tenderness or deformity.   Lymphadenopathy:      Cervical: No cervical adenopathy.   Skin:     General: Skin is warm and dry.      Findings: No rash.      Nails: There is no clubbing.     Neurological:      Mental Status: She is alert and oriented to person, place, and time.      Cranial Nerves: No cranial nerve deficit.      Gait: Gait normal.   Psychiatric:         Mood and Affect: Mood normal.         Behavior: Behavior " normal.         PFTs as reviewed by me personally: as per hPI    Assessment:  1. Asthma, unspecified asthma severity, unspecified whether complicated, unspecified whether persistent  fluticasone-salmeterol (ADVAIR DISKUS) 250-50 MCG/DOSE AEROSOL POWDER, BREATH ACTIVATED   2. History of tobacco use         Plan:  1.  Patient with signs and symptoms more suggestive of asthma particularly given reversibility as she had abnormal spirometry prior to prednisone therapy and this resolved after treatment with prednisone.  For this I do think she would benefit from inhaled corticosteroid at higher dose and Trelegy and Trelegy is more geared for COPD which the patient does not have.  I would like to transition her back to Advair as she was well-controlled on this in the past and it does allow us simply way to go up and down on the inhaled corticosteroid dose.  We will resume Advair 250 and continue short acting bronchodilators as needed.  We will plan to see her back in early fall or sooner if necessary.  I encouraged her to contact if symptoms worsen or she has increased use of rescue bronchodilator.  2.  Patient tobacco free for greater than 20 years and does not qualify for lung cancer screening.  I encouraged ongoing abstinence.  Return in about 8 months (around 9/15/2020).

## 2020-08-12 ENCOUNTER — APPOINTMENT (RX ONLY)
Dept: URBAN - METROPOLITAN AREA CLINIC 20 | Facility: CLINIC | Age: 65
Setting detail: DERMATOLOGY
End: 2020-08-12

## 2020-08-12 DIAGNOSIS — L82.1 OTHER SEBORRHEIC KERATOSIS: ICD-10-CM

## 2020-08-12 PROBLEM — D48.5 NEOPLASM OF UNCERTAIN BEHAVIOR OF SKIN: Status: ACTIVE | Noted: 2020-08-12

## 2020-08-12 PROCEDURE — ? BIOPSY BY SHAVE METHOD

## 2020-08-12 PROCEDURE — 99202 OFFICE O/P NEW SF 15 MIN: CPT | Mod: 25

## 2020-08-12 PROCEDURE — ? COUNSELING

## 2020-08-12 PROCEDURE — 11102 TANGNTL BX SKIN SINGLE LES: CPT

## 2020-08-12 ASSESSMENT — LOCATION SIMPLE DESCRIPTION DERM: LOCATION SIMPLE: CHEST

## 2020-08-12 ASSESSMENT — LOCATION DETAILED DESCRIPTION DERM: LOCATION DETAILED: STERNAL NOTCH

## 2020-08-12 ASSESSMENT — LOCATION ZONE DERM: LOCATION ZONE: TRUNK

## 2020-08-12 NOTE — PROCEDURE: BIOPSY BY SHAVE METHOD

## 2020-10-21 ENCOUNTER — OFFICE VISIT (OUTPATIENT)
Dept: URGENT CARE | Facility: PHYSICIAN GROUP | Age: 65
End: 2020-10-21
Payer: MEDICARE

## 2020-10-21 ENCOUNTER — HOSPITAL ENCOUNTER (OUTPATIENT)
Facility: MEDICAL CENTER | Age: 65
End: 2020-10-21
Attending: PHYSICIAN ASSISTANT
Payer: MEDICARE

## 2020-10-21 VITALS
TEMPERATURE: 99.3 F | BODY MASS INDEX: 27.31 KG/M2 | OXYGEN SATURATION: 98 % | RESPIRATION RATE: 16 BRPM | HEIGHT: 64 IN | DIASTOLIC BLOOD PRESSURE: 86 MMHG | WEIGHT: 160 LBS | HEART RATE: 96 BPM | SYSTOLIC BLOOD PRESSURE: 120 MMHG

## 2020-10-21 DIAGNOSIS — R68.83 CHILLS: ICD-10-CM

## 2020-10-21 DIAGNOSIS — R52 BODY ACHES: ICD-10-CM

## 2020-10-21 DIAGNOSIS — J44.1 COPD EXACERBATION (HCC): ICD-10-CM

## 2020-10-21 DIAGNOSIS — R05.9 COUGH: ICD-10-CM

## 2020-10-21 LAB
FLUAV+FLUBV AG SPEC QL IA: NORMAL
INT CON NEG: NEGATIVE
INT CON POS: POSITIVE

## 2020-10-21 PROCEDURE — 99214 OFFICE O/P EST MOD 30 MIN: CPT | Performed by: PHYSICIAN ASSISTANT

## 2020-10-21 PROCEDURE — U0003 INFECTIOUS AGENT DETECTION BY NUCLEIC ACID (DNA OR RNA); SEVERE ACUTE RESPIRATORY SYNDROME CORONAVIRUS 2 (SARS-COV-2) (CORONAVIRUS DISEASE [COVID-19]), AMPLIFIED PROBE TECHNIQUE, MAKING USE OF HIGH THROUGHPUT TECHNOLOGIES AS DESCRIBED BY CMS-2020-01-R: HCPCS

## 2020-10-21 PROCEDURE — 87804 INFLUENZA ASSAY W/OPTIC: CPT | Performed by: PHYSICIAN ASSISTANT

## 2020-10-21 RX ORDER — PREDNISONE 10 MG/1
40 TABLET ORAL DAILY
Qty: 20 TAB | Refills: 0 | Status: SHIPPED | OUTPATIENT
Start: 2020-10-21 | End: 2020-10-26

## 2020-10-21 RX ORDER — AZITHROMYCIN 250 MG/1
TABLET, FILM COATED ORAL
Qty: 6 TAB | Refills: 0 | Status: SHIPPED
Start: 2020-10-21 | End: 2021-07-18

## 2020-10-21 ASSESSMENT — ENCOUNTER SYMPTOMS
VOMITING: 0
SPUTUM PRODUCTION: 0
CHILLS: 0
DIARRHEA: 0
HEADACHES: 0
EYE PAIN: 0
NAUSEA: 0
FEVER: 0
ABDOMINAL PAIN: 0
PALPITATIONS: 0
COUGH: 1
SHORTNESS OF BREATH: 0
CONSTIPATION: 0
MYALGIAS: 1
SORE THROAT: 0

## 2020-10-22 ENCOUNTER — TELEPHONE (OUTPATIENT)
Dept: URGENT CARE | Facility: PHYSICIAN GROUP | Age: 65
End: 2020-10-22

## 2020-10-22 DIAGNOSIS — R68.83 CHILLS: ICD-10-CM

## 2020-10-22 DIAGNOSIS — R52 BODY ACHES: ICD-10-CM

## 2020-10-22 LAB
COVID ORDER STATUS COVID19: NORMAL
SARS-COV-2 RNA RESP QL NAA+PROBE: DETECTED
SPECIMEN SOURCE: ABNORMAL

## 2020-10-22 NOTE — PROGRESS NOTES
Subjective:   Wilda Oviedo is a 65 y.o. female who presents for Cough (cough, chills, bodyaches x2 days)      Is a 65-year-old female with a history of asthma who is on dual therapy of a maintenance inhaler as well as an albuterol inhaler presenting complaining of cough, chills, body aches develop in the last 2 days.  The patient reports her breathing is been controlled and she has not noticed any dyspnea but she is only been using her albuterol inhaler once daily.  She is not noticed any overt fevers, chest pain, gastrointestinal symptoms.  Her most bothersome symptom of this is her ongoing malaise and fatigue      Review of Systems   Constitutional: Positive for malaise/fatigue. Negative for chills and fever.   HENT: Positive for congestion. Negative for ear pain and sore throat.    Eyes: Negative for pain.   Respiratory: Positive for cough. Negative for sputum production and shortness of breath.    Cardiovascular: Negative for chest pain and palpitations.   Gastrointestinal: Negative for abdominal pain, constipation, diarrhea, nausea and vomiting.   Genitourinary: Negative for dysuria.   Musculoskeletal: Positive for myalgias.   Skin: Negative for rash.   Neurological: Negative for headaches.   Endo/Heme/Allergies: Positive for environmental allergies.       Medications:    • 5-HTP PO  • albuterol Aers  • azithromycin Tabs  • CALCIUM PO  • DAILY VITAMINS PO  • fluticasone-salmeterol Aepb  • predniSONE Tabs  • VITAMIN B COMPLEX PO  • vitamin D3 Caps    Allergies: Patient has no known allergies.    Problem List: Wilda Oviedo has Hypothyroid and Thyroid nodule on their problem list.    Surgical History:  Past Surgical History:   Procedure Laterality Date   • GYN SURGERY      tubes tied        Past Social Hx: Wilda Oviedo  reports that she quit smoking about 46 years ago. Her smoking use included cigarettes. She has a 20.00 pack-year smoking history. She has never used smokeless tobacco. She reports  "current alcohol use of about 1.2 - 1.8 oz of alcohol per week. She reports that she does not use drugs.     Past Family Hx:  Wilda Oviedo family history includes COPD in her mother; No Known Problems in her father.     Problem list, medications, and allergies reviewed by myself today in Epic.     Objective:     /86 (BP Location: Left arm, Patient Position: Sitting, BP Cuff Size: Small adult)   Pulse 96   Temp 37.4 °C (99.3 °F) (Temporal)   Resp 16   Ht 1.626 m (5' 4\")   Wt 72.6 kg (160 lb)   SpO2 98%   BMI 27.46 kg/m²     Physical Exam  Vitals signs reviewed.   Constitutional:       Appearance: Normal appearance.   HENT:      Head: Normocephalic and atraumatic.      Right Ear: External ear normal.      Left Ear: External ear normal.      Nose: Nose normal.      Mouth/Throat:      Mouth: Mucous membranes are moist.   Eyes:      Conjunctiva/sclera: Conjunctivae normal.   Cardiovascular:      Rate and Rhythm: Normal rate and regular rhythm.   Pulmonary:      Effort: Pulmonary effort is normal. No respiratory distress.      Comments: End expiratory wheezes heard in all lung sounds, no other adventitious lung sounds.  No increased work of breathing or respiratory distress.  Not tachypneic  Musculoskeletal: Normal range of motion.      Right lower leg: No edema.      Left lower leg: No edema.   Skin:     General: Skin is warm and dry.      Capillary Refill: Capillary refill takes less than 2 seconds.   Neurological:      Mental Status: She is alert and oriented to person, place, and time.         Lab Results/POC Test Results     · Point of Care Influenza testing is negative           Assessment/Plan:     Diagnosis and associated orders:     1. Cough     2. Body aches  COVID/SARS COV-2 PCR    POCT Influenza A/B   3. Chills  COVID/SARS COV-2 PCR    POCT Influenza A/B   4. COPD exacerbation (HCC)  azithromycin (ZITHROMAX) 250 MG Tab    predniSONE (DELTASONE) 10 MG Tab      Comments/MDM:     • I had a " shared decision-making conversation with the patient regarding the utility of chest x-ray.  She has reported history of asthma but a 20-year pack history as well as dual therapy with maintenance inhaler and albuterol and so I am inclined to treat this as a COPD exacerbation with prednisone and azithromycin.  Given the current pandemic I would like to perform testing for Covid and optimize her medical therapy with albuterol 3-4 times per day before putting her on a steroid.  Hopefully will get the Covid result back and if negative I will have her continue with the prednisone and azithromycin.  If she had any point time and feels like she is getting worsening shortness of breath I instructed her to fill the prednisone and azithromycin.  Her rapid flu is negative in clinic.  • I confirmed her telephone number and will call if her Covid result is positive or communicate with her via [employer] if her Covid result is negative.  We discussed supportive care, ER precautions, and considerations of self-isolation           Differential diagnosis, natural history, supportive care, and indications for immediate follow-up discussed.    Advised the patient to follow-up with the primary care physician for recheck, reevaluation, and consideration of further management.    Please note that this dictation was created using voice recognition software. I have made a reasonable attempt to correct obvious errors, but I expect that there are errors of grammar and possibly content that I did not discover before finalizing the note.    This note was electronically signed by José Miguel Bishop PA-C

## 2020-10-23 ENCOUNTER — TELEPHONE (OUTPATIENT)
Dept: URGENT CARE | Facility: PHYSICIAN GROUP | Age: 65
End: 2020-10-23

## 2020-10-23 NOTE — TELEPHONE ENCOUNTER
Pt called and asked how long to quarantine. I advised that she follow the CDC guidelines. She states almost all of her symptoms are gone.

## 2020-12-07 ENCOUNTER — HOSPITAL ENCOUNTER (OUTPATIENT)
Dept: RADIOLOGY | Facility: MEDICAL CENTER | Age: 65
End: 2020-12-07
Attending: FAMILY MEDICINE
Payer: MEDICARE

## 2020-12-07 DIAGNOSIS — Z12.31 ENCOUNTER FOR MAMMOGRAM TO ESTABLISH BASELINE MAMMOGRAM: ICD-10-CM

## 2020-12-07 PROCEDURE — 77067 SCR MAMMO BI INCL CAD: CPT

## 2021-03-03 DIAGNOSIS — Z23 NEED FOR VACCINATION: ICD-10-CM

## 2021-07-18 ENCOUNTER — OFFICE VISIT (OUTPATIENT)
Dept: URGENT CARE | Facility: PHYSICIAN GROUP | Age: 66
End: 2021-07-18
Payer: MEDICARE

## 2021-07-18 VITALS
TEMPERATURE: 98.2 F | WEIGHT: 160 LBS | BODY MASS INDEX: 28.35 KG/M2 | SYSTOLIC BLOOD PRESSURE: 156 MMHG | HEIGHT: 63 IN | RESPIRATION RATE: 18 BRPM | DIASTOLIC BLOOD PRESSURE: 74 MMHG | HEART RATE: 84 BPM | OXYGEN SATURATION: 96 %

## 2021-07-18 DIAGNOSIS — J40 BRONCHITIS: ICD-10-CM

## 2021-07-18 DIAGNOSIS — R05.9 COUGH: ICD-10-CM

## 2021-07-18 DIAGNOSIS — J45.21 MILD INTERMITTENT ASTHMA WITH ACUTE EXACERBATION: ICD-10-CM

## 2021-07-18 PROCEDURE — 99214 OFFICE O/P EST MOD 30 MIN: CPT | Performed by: PHYSICIAN ASSISTANT

## 2021-07-18 RX ORDER — PREDNISONE 20 MG/1
40 TABLET ORAL DAILY
Qty: 10 TABLET | Refills: 0 | Status: SHIPPED | OUTPATIENT
Start: 2021-07-18 | End: 2021-07-23

## 2021-07-18 RX ORDER — DEXTROMETHORPHAN HYDROBROMIDE AND PROMETHAZINE HYDROCHLORIDE 15; 6.25 MG/5ML; MG/5ML
5 SYRUP ORAL EVERY 4 HOURS PRN
Qty: 120 ML | Refills: 0 | Status: SHIPPED | OUTPATIENT
Start: 2021-07-18 | End: 2021-08-30

## 2021-07-18 RX ORDER — IBUPROFEN 800 MG/1
TABLET ORAL
COMMUNITY
Start: 2021-05-10 | End: 2021-07-18

## 2021-07-18 ASSESSMENT — ENCOUNTER SYMPTOMS
FEVER: 0
VOMITING: 0
NAUSEA: 0
DIARRHEA: 0
CHILLS: 0
COUGH: 1
SHORTNESS OF BREATH: 0
HEMOPTYSIS: 0
ABDOMINAL PAIN: 0
RHINORRHEA: 1
WHEEZING: 1
SORE THROAT: 0

## 2021-07-18 NOTE — PROGRESS NOTES
Subjective:   Wilda vOiedo is a 65 y.o. female who presents for Cough (cough, congestion x20 days)      Cough  This is a new problem. Episode onset: 20 days. The problem has been waxing and waning. Cough characteristics: Productive clear phlegm. Associated symptoms include postnasal drip, rhinorrhea and wheezing. Pertinent negatives include no chest pain, chills, ear congestion, fever, hemoptysis, nasal congestion, sore throat or shortness of breath. She has tried a beta-agonist inhaler and OTC cough suppressant for the symptoms. The treatment provided mild relief. Her past medical history is significant for asthma.       Review of Systems   Constitutional: Negative for chills and fever.   HENT: Positive for postnasal drip and rhinorrhea. Negative for sore throat.    Respiratory: Positive for cough and wheezing. Negative for hemoptysis and shortness of breath.    Cardiovascular: Negative for chest pain.   Gastrointestinal: Negative for abdominal pain, diarrhea, nausea and vomiting.       Medications:    • 5-HTP PO  • albuterol Aers  • azithromycin Tabs  • CALCIUM PO  • DAILY VITAMINS PO  • fluticasone-salmeterol Aepb  • ibuprofen Tabs  • VITAMIN B COMPLEX PO  • vitamin D3 Caps    Allergies: Patient has no known allergies.    Problem List: Wilda Oviedo does not have any pertinent problems on file.    Surgical History:  Past Surgical History:   Procedure Laterality Date   • GYN SURGERY      tubes tied        Past Social Hx: Wilda Oviedo  reports that she quit smoking about 47 years ago. Her smoking use included cigarettes. She has a 20.00 pack-year smoking history. She has never used smokeless tobacco. She reports current alcohol use of about 1.2 - 1.8 oz of alcohol per week. She reports that she does not use drugs.     Past Family Hx:  Wilda Oviedo family history includes COPD in her mother; No Known Problems in her father.     Problem list, medications, and allergies reviewed by myself today  "in Epic.     Objective:     /74 (BP Location: Left arm, Patient Position: Sitting, BP Cuff Size: Small adult)   Pulse 84   Temp 36.8 °C (98.2 °F) (Temporal)   Resp 18   Ht 1.6 m (5' 3\")   Wt 72.6 kg (160 lb)   SpO2 96%   BMI 28.34 kg/m²     Physical Exam  Vitals reviewed.   Constitutional:       General: She is not in acute distress.     Appearance: Normal appearance. She is not ill-appearing or toxic-appearing.   HENT:      Head: Normocephalic.      Right Ear: Tympanic membrane normal.      Left Ear: Tympanic membrane normal.      Nose: Nose normal.      Mouth/Throat:      Mouth: Mucous membranes are moist.      Pharynx: Oropharynx is clear.   Eyes:      Conjunctiva/sclera: Conjunctivae normal.      Pupils: Pupils are equal, round, and reactive to light.   Cardiovascular:      Rate and Rhythm: Normal rate and regular rhythm.      Heart sounds: Normal heart sounds.   Pulmonary:      Effort: Pulmonary effort is normal. No respiratory distress.      Breath sounds: Examination of the right-upper field reveals rhonchi. Examination of the left-upper field reveals rhonchi. Rhonchi (mild expiratory that clear with coughing ) present. No wheezing or rales.   Musculoskeletal:      Cervical back: Neck supple.   Lymphadenopathy:      Cervical: No cervical adenopathy.   Skin:     General: Skin is warm and dry.   Neurological:      General: No focal deficit present.      Mental Status: She is alert and oriented to person, place, and time.   Psychiatric:         Mood and Affect: Mood normal.         Behavior: Behavior normal.           Assessment/Associated Orders     1. Cough  predniSONE (DELTASONE) 20 MG Tab    promethazine-dextromethorphan (PROMETHAZINE-DM) 6.25-15 MG/5ML syrup   2. Mild intermittent asthma with acute exacerbation     3. Bronchitis         Medical Decision Making      Discussed with patient signs and symptoms most likely are due to a viral etiology. Lingering cough. Reactive airway.      Treatment " as above.   Plenty of oral hydration and rest   Over the counter cough suppressant as directed.  Tylenol or ibuprofen for pain and fever as directed.   Warm salt water gargles for sore throat, soft foods, cool liquids.   Saline nasal spray and Flonase as a decongestant.   Overall, the patient is well-appearing. They are not hypoxic, afebrile.    I personally reviewed prior external notes and test results pertinent to today's visit. Red flags discussed and indications to present to the Emergency Department. Supportive care, natural history, differential diagnoses, and indications for immediate follow-up discussed. Patient expresses understanding and agrees to plan. Patient denies any other questions or concerns.     Advised the patient to follow-up with the primary care physician for recheck, reevaluation, and consideration of further management.    Please note that this dictation was created using voice recognition software. I have made a reasonable attempt to correct obvious errors, but I expect that there are errors of grammar and possibly content that I did not discover before finalizing the note.    This note was electronically signed by Clem Arthur PA-C

## 2021-08-04 PROBLEM — G89.29 CHRONIC PAIN OF RIGHT KNEE: Status: ACTIVE | Noted: 2021-08-04

## 2021-08-04 PROBLEM — M25.561 CHRONIC PAIN OF RIGHT KNEE: Status: ACTIVE | Noted: 2021-08-04

## 2021-09-08 ENCOUNTER — HOSPITAL ENCOUNTER (OUTPATIENT)
Dept: RADIOLOGY | Facility: MEDICAL CENTER | Age: 66
End: 2021-09-08
Attending: FAMILY MEDICINE
Payer: MEDICARE

## 2021-09-08 DIAGNOSIS — Z78.0 MENOPAUSE: ICD-10-CM

## 2021-09-08 PROCEDURE — 77080 DXA BONE DENSITY AXIAL: CPT

## 2022-02-02 ENCOUNTER — HOSPITAL ENCOUNTER (OUTPATIENT)
Dept: RADIOLOGY | Facility: MEDICAL CENTER | Age: 67
End: 2022-02-02
Attending: FAMILY MEDICINE
Payer: MEDICARE

## 2022-02-02 DIAGNOSIS — Z12.31 ENCOUNTER FOR MAMMOGRAM TO ESTABLISH BASELINE MAMMOGRAM: ICD-10-CM

## 2022-02-02 PROCEDURE — 77063 BREAST TOMOSYNTHESIS BI: CPT

## 2022-03-09 ENCOUNTER — OFFICE VISIT (OUTPATIENT)
Dept: URGENT CARE | Facility: PHYSICIAN GROUP | Age: 67
End: 2022-03-09
Payer: MEDICARE

## 2022-03-09 ENCOUNTER — HOSPITAL ENCOUNTER (OUTPATIENT)
Dept: LAB | Facility: MEDICAL CENTER | Age: 67
End: 2022-03-09
Attending: STUDENT IN AN ORGANIZED HEALTH CARE EDUCATION/TRAINING PROGRAM
Payer: MEDICARE

## 2022-03-09 VITALS
OXYGEN SATURATION: 97 % | HEIGHT: 63 IN | BODY MASS INDEX: 29.23 KG/M2 | SYSTOLIC BLOOD PRESSURE: 144 MMHG | RESPIRATION RATE: 20 BRPM | HEART RATE: 90 BPM | WEIGHT: 165 LBS | DIASTOLIC BLOOD PRESSURE: 82 MMHG | TEMPERATURE: 97.6 F

## 2022-03-09 DIAGNOSIS — K21.00 GASTROESOPHAGEAL REFLUX DISEASE WITH ESOPHAGITIS WITHOUT HEMORRHAGE: ICD-10-CM

## 2022-03-09 LAB
ALBUMIN SERPL BCP-MCNC: 4.6 G/DL (ref 3.2–4.9)
ALBUMIN/GLOB SERPL: 2.1 G/DL
ALP SERPL-CCNC: 84 U/L (ref 30–99)
ALT SERPL-CCNC: 13 U/L (ref 2–50)
ANION GAP SERPL CALC-SCNC: 14 MMOL/L (ref 7–16)
AST SERPL-CCNC: 19 U/L (ref 12–45)
BILIRUB SERPL-MCNC: 0.4 MG/DL (ref 0.1–1.5)
BUN SERPL-MCNC: 9 MG/DL (ref 8–22)
CALCIUM SERPL-MCNC: 9.4 MG/DL (ref 8.5–10.5)
CHLORIDE SERPL-SCNC: 101 MMOL/L (ref 96–112)
CO2 SERPL-SCNC: 26 MMOL/L (ref 20–33)
CREAT SERPL-MCNC: 0.61 MG/DL (ref 0.5–1.4)
GLOBULIN SER CALC-MCNC: 2.2 G/DL (ref 1.9–3.5)
GLUCOSE SERPL-MCNC: 102 MG/DL (ref 65–99)
LIPASE SERPL-CCNC: 30 U/L (ref 11–82)
POTASSIUM SERPL-SCNC: 3.8 MMOL/L (ref 3.6–5.5)
PROT SERPL-MCNC: 6.8 G/DL (ref 6–8.2)
SODIUM SERPL-SCNC: 141 MMOL/L (ref 135–145)

## 2022-03-09 PROCEDURE — 99214 OFFICE O/P EST MOD 30 MIN: CPT | Performed by: STUDENT IN AN ORGANIZED HEALTH CARE EDUCATION/TRAINING PROGRAM

## 2022-03-09 PROCEDURE — 83690 ASSAY OF LIPASE: CPT

## 2022-03-09 PROCEDURE — 80053 COMPREHEN METABOLIC PANEL: CPT

## 2022-03-09 PROCEDURE — 36415 COLL VENOUS BLD VENIPUNCTURE: CPT

## 2022-03-09 RX ORDER — PANTOPRAZOLE SODIUM 40 MG/1
40 TABLET, DELAYED RELEASE ORAL DAILY
Qty: 90 TABLET | Refills: 0 | Status: SHIPPED | OUTPATIENT
Start: 2022-03-09

## 2022-03-09 NOTE — PROGRESS NOTES
"Subjective:   CHIEF COMPLAINT  Chief Complaint   Patient presents with   • Back Pain     Left side; Pt states that it doesn't let her sleep-4x days       HPI  Wilda Oviedo is a 66 y.o. female who presents with a chief complaint of left periscapular pain.  She points to left of midline thoracic region as source of discomfort.  Symptoms started 4 days ago and have been only at night after she has been laying down for approximately 2 hours.  Says she feels like she has been stuck by a \"hot poker.\"  Symptoms resolve after sitting up but subsequently the patient has not been sleeping the last 4 nights due to the discomfort.  She is not experiencing any epigastric discomfort.  She has a PMH of NSAID induced gastritis that was previously managed with sucralfate and Protonix.  She does not take any NSAIDs.  She is no longer on a daily antacid.  She tried taking sulcrafate f last night and Pepto-Bismol which did not help.  Patient denies experience any chest pain.  No chest pain on exertion.  No nausea or vomiting.  No diarrhea or blood in the stools.  She does not drink any alcohol.    REVIEW OF SYSTEMS  General: no fever or chills  GI: no nausea or vomiting  See HPI for further details.    PAST MEDICAL HISTORY  Patient Active Problem List    Diagnosis Date Noted   • Chronic pain of right knee 08/04/2021   • Thyroid nodule 08/13/2010   • Hypothyroid 11/10/2009       SURGICAL HISTORY   has a past surgical history that includes gyn surgery.    ALLERGIES  No Known Allergies    CURRENT MEDICATIONS  Home Medications     Reviewed by Mehrdad Mendoza D.O. (Physician) on 03/09/22 at 0941  Med List Status: <None>   Medication Last Dose Status   albuterol 108 (90 Base) MCG/ACT Aero Soln inhalation aerosol PRN Active   B Complex Vitamins (VITAMIN B COMPLEX PO) Taking Active   CALCIUM PO Taking Active   Cholecalciferol (VITAMIN D3) 5000 units Cap Taking Active   fluticasone-salmeterol (ADVAIR DISKUS) 250-50 MCG/DOSE AEROSOL " "POWDER, BREATH ACTIVATED PRN Active   Multiple Vitamin (DAILY VITAMINS PO) Taking Active   pantoprazole (PROTONIX) 40 MG Tablet Delayed Response  Active                SOCIAL HISTORY  Social History     Tobacco Use   • Smoking status: Former Smoker     Packs/day: 2.00     Years: 10.00     Pack years: 20.00     Types: Cigarettes     Quit date:      Years since quittin.2   • Smokeless tobacco: Never Used   Vaping Use   • Vaping Use: Never used   Substance and Sexual Activity   • Alcohol use: Yes     Alcohol/week: 1.2 - 1.8 oz     Types: 2 - 3 Glasses of wine per week   • Drug use: No   • Sexual activity: Not on file       FAMILY HISTORY  Family History   Problem Relation Age of Onset   • COPD Mother    • Breast Cancer Mother    • No Known Problems Father    • Stroke Neg Hx    • Heart Disease Neg Hx           Objective:   PHYSICAL EXAM  VITAL SIGNS: /82 (BP Location: Right arm, Patient Position: Sitting, BP Cuff Size: Adult)   Pulse 90   Temp 36.4 °C (97.6 °F) (Temporal)   Resp 20   Ht 1.6 m (5' 3\")   Wt 74.8 kg (165 lb)   SpO2 97%   BMI 29.23 kg/m²     Gen: no acute distress, normal voice  Skin: dry, intact, moist mucosal membranes  Lungs: CTAB w/ symmetric expansion  CV: RRR w/o murmurs or clicks  Abdomen: Bowel sounds normal, soft, no tenderness, rebound or guarding.   Psych: normal affect, normal judgement, alert, awake    Assessment/Plan:     1. Gastroesophageal reflux disease with esophagitis without hemorrhage  Comp Metabolic Panel    LIPASE    pantoprazole (PROTONIX) 40 MG Tablet Delayed Response    Referral to Gastroenterology   Signs and symptoms consistent with acid reflux.  CMP and lipase were ordered to rule out biliary vs pancreatic cause; labs were completely unremarkable.  I contacted the patient and spoke with her over the phone reviewing the labs.  -Ordered Protonix  -Ordered urgent referral to follow-up with GI  -Okay to continue taking sucralfate as needed  -Return to urgent " care or emergency room any new/worsening symptoms or further questions or concerns.  Patient understood everything discussed.  All questions were answered.    Differential diagnosis, natural history, supportive care, and indications for immediate follow-up discussed. All questions answered. Patient agrees with the plan of care.    Follow-up as needed if symptoms worsen or fail to improve to PCP, Urgent care or Emergency Room.    Between 30-39 minutes was spent caring for this patient on the day of the encounter which included face-to-face time, discussing labs over the phone, discussing the diagnosis, medical management, follow-up, emergency room precautions and completion of the chart. This does not include time spent on separately billable procedures/tests.        Please note that this dictation was created using voice recognition software. I have made a reasonable attempt to correct obvious errors, but I expect that there are errors of grammar and possibly content that I did not discover before finalizing the note.

## 2022-04-04 ENCOUNTER — HOSPITAL ENCOUNTER (OUTPATIENT)
Dept: RADIOLOGY | Facility: MEDICAL CENTER | Age: 67
End: 2022-04-04
Payer: MEDICARE

## 2022-04-04 DIAGNOSIS — M25.512 LEFT SHOULDER PAIN, UNSPECIFIED CHRONICITY: ICD-10-CM

## 2022-04-04 PROCEDURE — 76700 US EXAM ABDOM COMPLETE: CPT

## 2022-05-06 ENCOUNTER — OFFICE VISIT (OUTPATIENT)
Dept: URGENT CARE | Facility: PHYSICIAN GROUP | Age: 67
End: 2022-05-06
Payer: MEDICARE

## 2022-05-06 VITALS
RESPIRATION RATE: 24 BRPM | BODY MASS INDEX: 29.23 KG/M2 | TEMPERATURE: 98.9 F | WEIGHT: 165 LBS | OXYGEN SATURATION: 96 % | SYSTOLIC BLOOD PRESSURE: 135 MMHG | HEIGHT: 63 IN | HEART RATE: 95 BPM | DIASTOLIC BLOOD PRESSURE: 88 MMHG

## 2022-05-06 DIAGNOSIS — B34.9 ACUTE VIRAL SYNDROME: ICD-10-CM

## 2022-05-06 DIAGNOSIS — J45.20 MILD INTERMITTENT ASTHMA, UNSPECIFIED WHETHER COMPLICATED: ICD-10-CM

## 2022-05-06 LAB
EXTERNAL QUALITY CONTROL: NORMAL
FLUAV+FLUBV AG SPEC QL IA: NEGATIVE
INT CON NEG: NORMAL
INT CON POS: NORMAL
SARS-COV+SARS-COV-2 AG RESP QL IA.RAPID: NORMAL

## 2022-05-06 PROCEDURE — 87426 SARSCOV CORONAVIRUS AG IA: CPT | Performed by: EMERGENCY MEDICINE

## 2022-05-06 PROCEDURE — 87804 INFLUENZA ASSAY W/OPTIC: CPT | Performed by: EMERGENCY MEDICINE

## 2022-05-06 PROCEDURE — 99213 OFFICE O/P EST LOW 20 MIN: CPT | Mod: CS | Performed by: EMERGENCY MEDICINE

## 2022-05-06 RX ORDER — PREDNISONE 20 MG/1
40 TABLET ORAL DAILY
Qty: 10 TABLET | Refills: 0 | Status: SHIPPED | OUTPATIENT
Start: 2022-05-06 | End: 2022-06-26

## 2022-05-06 ASSESSMENT — ENCOUNTER SYMPTOMS
SORE THROAT: 0
SPUTUM PRODUCTION: 0
SHORTNESS OF BREATH: 1
SINUS PAIN: 0
WHEEZING: 1
CHILLS: 0
COUGH: 0
VOMITING: 0
FEVER: 1
NAUSEA: 0

## 2022-05-06 NOTE — PROGRESS NOTES
"  Subjective:     Wilda Oviedo  is a 66 y.o. female who presents for Cough (Cough, fever, fatigue, BA, HA, congestion x 5 days)       Is a 66-year-old female who reports she has been ill for 5 days.  She states she she just feels really bad, weak, tired.  She reports intermittent subjective fevers.  Loose cough with clear sputum.  Occasional wheezing which is improved with her MDI.  Occasional shortness of breath which is also improved with her MDI.  She denies any GI symptoms any nausea, vomiting, abdominal pain.  She most recently used the MDI this morning.  She did a COVID swab on Monday which was negative.  She is a non-smoker.  Aside from hypothyroidism she is basically healthy.  Last Tylenol dose was this morning.   Review of Systems   Constitutional: Positive for fever and malaise/fatigue. Negative for chills.   HENT: Positive for congestion. Negative for ear pain, sinus pain and sore throat.    Respiratory: Positive for shortness of breath and wheezing. Negative for cough and sputum production.    Cardiovascular: Negative for chest pain.   Gastrointestinal: Negative for nausea and vomiting.   Skin: Negative for rash.   All other systems reviewed and are negative.   No Known Allergies  Past Medical History:   Diagnosis Date   • Asthma         Objective:   /88 (BP Location: Left arm, Patient Position: Sitting, BP Cuff Size: Large adult long)   Pulse 95   Temp 37.2 °C (98.9 °F) (Temporal)   Resp (!) 24   Ht 1.6 m (5' 3\")   Wt 74.8 kg (165 lb)   SpO2 96%   BMI 29.23 kg/m²   Physical Exam  Vitals (Patient not tachypneic at the time of my exam.) and nursing note reviewed.   Constitutional:       General: She is not in acute distress.     Appearance: Normal appearance. She is not ill-appearing or toxic-appearing.   HENT:      Head: Normocephalic and atraumatic.      Right Ear: Tympanic membrane, ear canal and external ear normal. There is no impacted cerumen.      Left Ear: Tympanic membrane, ear " canal and external ear normal. There is no impacted cerumen.      Nose: No rhinorrhea.      Mouth/Throat:      Mouth: Mucous membranes are moist.      Pharynx: No oropharyngeal exudate or posterior oropharyngeal erythema.   Eyes:      General: No scleral icterus.        Right eye: No discharge.         Left eye: No discharge.   Cardiovascular:      Rate and Rhythm: Normal rate and regular rhythm.      Heart sounds: Normal heart sounds. No murmur heard.  Pulmonary:      Effort: Pulmonary effort is normal. No respiratory distress.      Breath sounds: Normal breath sounds. No wheezing.   Abdominal:      Palpations: Abdomen is soft.      Tenderness: There is no abdominal tenderness.   Musculoskeletal:         General: No swelling.      Cervical back: Normal range of motion and neck supple.      Right lower leg: No edema.      Left lower leg: No edema.   Skin:     General: Skin is warm and dry.      Coloration: Skin is not jaundiced.      Findings: No rash.   Neurological:      General: No focal deficit present.      Mental Status: She is alert and oriented to person, place, and time.   Psychiatric:         Mood and Affect: Mood normal.         Behavior: Behavior normal.         Thought Content: Thought content normal.           Assessment/Plan:     Encounter Diagnoses   Name Primary?   • Acute viral syndrome    • Mild intermittent asthma, unspecified whether complicated    With viral syndrome, on the outside for eligibility for antivirals, though will check for flu and COVID.  Failing that given that her major component seems to be respiratory, will consider a short burst of steroids.    COVID-negative, flu negative     Plan for care for today's complaint includes Supportive measures as well as a short burst of steroids, continue MDI for dyspnea..  Prescription for Prednisone x5 days provided.  Natural history, supportive care measures, and indications for immediate follow-up for Oral syndrome discussed.    Patient's  questions answered.  Advised that patient arrange routine followup care with primary physician.    See AVS Instructions below for written guidance provided to patient on after-visit management and care in addition to our verbal discussion during the visit.    Please note that this dictation was created using voice recognition software. I have attempted to correct all errors, but there may be sound-alike, spelling, grammar and possibly content errors that I did not discover before finalizing the note.

## 2022-05-06 NOTE — PATIENT INSTRUCTIONS
Use flonase 2 squirts each nostril twice daily x 3-4 days then as needed., Over-the-counter decongestant of your choice as directed (sudafed or similar, chlorpheniramine if history of high blood pressure)., Tylenol or ibuprofen as directed for pain or fever., Mucinex as directed to loosen up the phlegm, Drink plenty of fluids.  and Followup with your doctor if not improved, return if shortness of breath, vomiting, unable to swallow, worse.        Upper Respiratory Infection, Adult  An upper respiratory infection (URI) is a common viral infection of the nose, throat, and upper air passages that lead to the lungs. The most common type of URI is the common cold. URIs usually get better on their own, without medical treatment.  What are the causes?  A URI is caused by a virus. You may catch a virus by:  · Breathing in droplets from an infected person's cough or sneeze.  · Touching something that has been exposed to the virus (contaminated) and then touching your mouth, nose, or eyes.  What increases the risk?  You are more likely to get a URI if:  · You are very young or very old.  · It is hakeem or winter.  · You have close contact with others, such as at a , school, or health care facility.  · You smoke.  · You have long-term (chronic) heart or lung disease.  · You have a weakened disease-fighting (immune) system.  · You have nasal allergies or asthma.  · You are experiencing a lot of stress.  · You work in an area that has poor air circulation.  · You have poor nutrition.  What are the signs or symptoms?  A URI usually involves some of the following symptoms:  · Runny or stuffy (congested) nose.  · Sneezing.  · Cough.  · Sore throat.  · Headache.  · Fatigue.  · Fever.  · Loss of appetite.  · Pain in your forehead, behind your eyes, and over your cheekbones (sinus pain).  · Muscle aches.  · Redness or irritation of the eyes.  · Pressure in the ears or face.  How is this diagnosed?  This condition may be  diagnosed based on your medical history and symptoms, and a physical exam. Your health care provider may use a cotton swab to take a mucus sample from your nose (nasal swab). This sample can be tested to determine what virus is causing the illness.  How is this treated?  URIs usually get better on their own within 7-10 days. You can take steps at home to relieve your symptoms. Medicines cannot cure URIs, but your health care provider may recommend certain medicines to help relieve symptoms, such as:  · Over-the-counter cold medicines.  · Cough suppressants. Coughing is a type of defense against infection that helps to clear the respiratory system, so take these medicines only as recommended by your health care provider.  · Fever-reducing medicines.  Follow these instructions at home:  Activity  · Rest as needed.  · If you have a fever, stay home from work or school until your fever is gone or until your health care provider says you are no longer contagious. Your health care provider may have you wear a face mask to prevent your infection from spreading.  Relieving symptoms  · Gargle with a salt-water mixture 3-4 times a day or as needed. To make a salt-water mixture, completely dissolve ½-1 tsp of salt in 1 cup of warm water.  · Use a cool-mist humidifier to add moisture to the air. This can help you breathe more easily.  Eating and drinking    · Drink enough fluid to keep your urine pale yellow.  · Eat soups and other clear broths.  General instructions    · Take over-the-counter and prescription medicines only as told by your health care provider. These include cold medicines, fever reducers, and cough suppressants.  · Do not use any products that contain nicotine or tobacco, such as cigarettes and e-cigarettes. If you need help quitting, ask your health care provider.  · Stay away from secondhand smoke.  · Stay up to date on all immunizations, including the yearly (annual) flu vaccine.  · Keep all follow-up  visits as told by your health care provider. This is important.  How to prevent the spread of infection to others    · URIs can be passed from person to person (are contagious). To prevent the infection from spreading:  ? Wash your hands often with soap and water. If soap and water are not available, use hand .  ? Avoid touching your mouth, face, eyes, or nose.  ? Cough or sneeze into a tissue or your sleeve or elbow instead of into your hand or into the air.  Contact a health care provider if:  · You are getting worse instead of better.  · You have a fever or chills.  · Your mucus is brown or red.  · You have yellow or brown discharge coming from your nose.  · You have pain in your face, especially when you bend forward.  · You have swollen neck glands.  · You have pain while swallowing.  · You have white areas in the back of your throat.  Get help right away if:  · You have shortness of breath that gets worse.  · You have severe or persistent:  ? Headache.  ? Ear pain.  ? Sinus pain.  ? Chest pain.  · You have chronic lung disease along with any of the following:  ? Wheezing.  ? Prolonged cough.  ? Coughing up blood.  ? A change in your usual mucus.  · You have a stiff neck.  · You have changes in your:  ? Vision.  ? Hearing.  ? Thinking.  ? Mood.  Summary  · An upper respiratory infection (URI) is a common infection of the nose, throat, and upper air passages that lead to the lungs.  · A URI is caused by a virus.  · URIs usually get better on their own within 7-10 days.  · Medicines cannot cure URIs, but your health care provider may recommend certain medicines to help relieve symptoms.  This information is not intended to replace advice given to you by your health care provider. Make sure you discuss any questions you have with your health care provider.  Document Released: 06/13/2002 Document Revised: 12/26/2019 Document Reviewed: 08/03/2018  Elsevier Patient Education © 2020 [employer]

## 2022-06-26 ENCOUNTER — OFFICE VISIT (OUTPATIENT)
Dept: URGENT CARE | Facility: PHYSICIAN GROUP | Age: 67
End: 2022-06-26
Payer: MEDICARE

## 2022-06-26 ENCOUNTER — HOSPITAL ENCOUNTER (OUTPATIENT)
Dept: RADIOLOGY | Facility: MEDICAL CENTER | Age: 67
End: 2022-06-26
Attending: FAMILY MEDICINE
Payer: MEDICARE

## 2022-06-26 VITALS
HEART RATE: 93 BPM | OXYGEN SATURATION: 95 % | TEMPERATURE: 99.3 F | RESPIRATION RATE: 16 BRPM | HEIGHT: 63 IN | BODY MASS INDEX: 28.35 KG/M2 | SYSTOLIC BLOOD PRESSURE: 124 MMHG | DIASTOLIC BLOOD PRESSURE: 74 MMHG | WEIGHT: 160 LBS

## 2022-06-26 DIAGNOSIS — J45.41 MODERATE PERSISTENT ASTHMA WITH EXACERBATION: ICD-10-CM

## 2022-06-26 DIAGNOSIS — R05.9 COUGH: ICD-10-CM

## 2022-06-26 PROCEDURE — 71046 X-RAY EXAM CHEST 2 VIEWS: CPT

## 2022-06-26 PROCEDURE — 99214 OFFICE O/P EST MOD 30 MIN: CPT | Performed by: FAMILY MEDICINE

## 2022-06-26 RX ORDER — METHYLPREDNISOLONE 4 MG/1
4 TABLET ORAL DAILY
Qty: 21 TABLET | Refills: 0 | Status: SHIPPED | OUTPATIENT
Start: 2022-06-26

## 2022-06-26 RX ORDER — BENZONATATE 200 MG/1
200 CAPSULE ORAL 3 TIMES DAILY PRN
Qty: 45 CAPSULE | Refills: 0 | Status: SHIPPED | OUTPATIENT
Start: 2022-06-26

## 2022-06-26 NOTE — PROGRESS NOTES
CC:  cough        Cough  This is a new problem. The current episode started 2 days ago. The problem has been unchanged. The problem occurs constantly. The cough is productive.   + wheezing, fever.   She has hx of asthma, currently on albuterol, prn and advair.          Pertinent negatives include no nasal d/c, sinus pain,   headaches, nausea, vomiting, diarrhea, sweats, weight loss  .           Past Medical History:   Diagnosis Date   • Asthma          Social History     Tobacco Use   • Smoking status: Former Smoker     Packs/day: 2.00     Years: 10.00     Pack years: 20.00     Types: Cigarettes     Quit date:      Years since quittin.5   • Smokeless tobacco: Never Used   Vaping Use   • Vaping Use: Never used   Substance Use Topics   • Alcohol use: Not Currently     Alcohol/week: 1.2 - 1.8 oz     Types: 2 - 3 Glasses of wine per week   • Drug use: No         Current Outpatient Medications on File Prior to Visit   Medication Sig Dispense Refill   • pantoprazole (PROTONIX) 40 MG Tablet Delayed Response Take 1 Tablet by mouth every day. 90 Tablet 0   • albuterol 108 (90 Base) MCG/ACT Aero Soln inhalation aerosol Inhale 2 Puffs by mouth every 6 hours as needed for Shortness of Breath.     • CALCIUM PO Take 1,000 mg by mouth every day.     • B Complex Vitamins (VITAMIN B COMPLEX PO) Take 50 mg by mouth every day.     • Cholecalciferol (VITAMIN D3) 5000 units Cap Take 1 Cap by mouth every day.     • fluticasone-salmeterol (ADVAIR DISKUS) 250-50 MCG/DOSE AEROSOL POWDER, BREATH ACTIVATED Inhale 1 Puff by mouth 2 times a day. Rinse mouth after each use 1 Inhaler 11   • Multiple Vitamin (DAILY VITAMINS PO) Take  by mouth.       No current facility-administered medications on file prior to visit.                    Review of Systems      HENT: negative for otalgia  Cardiovascular - denies chest pain or dyspnea  Respiratory: Positive for cough.  .  Negative for wheezing.    Neurological: Negative for headaches.   GI -  "denies nausea, vomiting or diarrhea  Neuro - denies numbness or tingling.            Objective:     /74 (BP Location: Left arm, Patient Position: Sitting, BP Cuff Size: Adult)   Pulse 93   Temp 37.4 °C (99.3 °F) (Temporal)   Resp 16   Ht 1.6 m (5' 3\")   Wt 72.6 kg (160 lb)   SpO2 95%     Physical Exam   Constitutional: patient is oriented to person, place, and time. Patient appears well-developed and well-nourished. No distress.   HENT:   Head: Normocephalic and atraumatic.   Right Ear: External ear normal.   Left Ear: External ear normal.   Nose: Mucosal edema  present. Right sinus exhibits no maxillary sinus tenderness. Left sinus exhibits no maxillary sinus tenderness.   Mouth/Throat: Mucous membranes are normal. No oral lesions.  No posterior pharyngeal erythema.  No oropharyngeal exudate or posterior oropharyngeal edema.   Eyes: Conjunctivae and EOM are normal. Pupils are equal, round, and reactive to light. Right eye exhibits no discharge. Left eye exhibits no discharge. No scleral icterus.   Neck: Normal range of motion. Neck supple. No tracheal deviation present.   Cardiovascular: Normal rate, regular rhythm and normal heart sounds.  Exam reveals no friction rub.    Pulmonary/Chest: Effort normal. No respiratory distress. Patient has no wheezes or rhonchi. Patient has no rales.    Musculoskeletal:  exhibits no edema.   Lymphadenopathy:     Patient has no cervical adenopathy.      Neurological: patient is alert and oriented to person, place, and time.   Skin: Skin is warm and dry. No rash noted. No erythema.   Psychiatric: patient  has a normal mood and affect.  behavior is normal.   Nursing note and vitals reviewed.              Assessment/Plan:       1. Cough      likely viral     Chest x-ray was personally interpreted and reviewed.   There was some minor atelectasis at both bases.      No acute cardiopulmonary findings. No pulmonary infiltrates or densities. Cardiac silhouette is normal. No " hemidiaphragm elevation. No bony abnormalities.      rx medrol due to wheezing in setting of asthma.      - methylPREDNISolone (MEDROL DOSEPAK) 4 MG Tablet Therapy Pack; Take 1 Tablet by mouth every day. Follow schedule on package instructions.  Dispense: 21 Tablet; Refill: 0  - benzonatate (TESSALON) 200 MG capsule; Take 1 Capsule by mouth 3 times a day as needed for Cough.  Dispense: 45 Capsule; Refill: 0        Pt refused COVID screening      Follow up in one week if no improvement, sooner if symptoms worsen.         My total time spent caring for the patient on the day of the encounter was 30 minutes.   This does not include time spent on separately billable procedures/tests.

## 2022-11-09 ENCOUNTER — PATIENT MESSAGE (OUTPATIENT)
Dept: HEALTH INFORMATION MANAGEMENT | Facility: OTHER | Age: 67
End: 2022-11-09

## 2022-11-21 ENCOUNTER — HOSPITAL ENCOUNTER (OUTPATIENT)
Dept: RADIOLOGY | Facility: MEDICAL CENTER | Age: 67
End: 2022-11-21
Attending: FAMILY MEDICINE
Payer: MEDICARE

## 2022-11-21 DIAGNOSIS — R05.9 COUGH: ICD-10-CM

## 2022-11-21 PROCEDURE — 71046 X-RAY EXAM CHEST 2 VIEWS: CPT

## 2023-02-03 ENCOUNTER — HOSPITAL ENCOUNTER (OUTPATIENT)
Dept: RADIOLOGY | Facility: MEDICAL CENTER | Age: 68
End: 2023-02-03
Attending: FAMILY MEDICINE
Payer: MEDICARE

## 2023-02-03 DIAGNOSIS — Z12.31 VISIT FOR SCREENING MAMMOGRAM: ICD-10-CM

## 2023-02-03 PROCEDURE — 77063 BREAST TOMOSYNTHESIS BI: CPT

## 2023-02-16 ENCOUNTER — APPOINTMENT (RX ONLY)
Dept: URBAN - METROPOLITAN AREA CLINIC 6 | Facility: CLINIC | Age: 68
Setting detail: DERMATOLOGY
End: 2023-02-16

## 2023-02-16 DIAGNOSIS — L82.0 INFLAMED SEBORRHEIC KERATOSIS: ICD-10-CM

## 2023-02-16 DIAGNOSIS — Z71.89 OTHER SPECIFIED COUNSELING: ICD-10-CM

## 2023-02-16 DIAGNOSIS — D18.0 HEMANGIOMA: ICD-10-CM

## 2023-02-16 DIAGNOSIS — L82.1 OTHER SEBORRHEIC KERATOSIS: ICD-10-CM

## 2023-02-16 DIAGNOSIS — L57.0 ACTINIC KERATOSIS: ICD-10-CM

## 2023-02-16 DIAGNOSIS — L81.4 OTHER MELANIN HYPERPIGMENTATION: ICD-10-CM

## 2023-02-16 PROBLEM — D18.01 HEMANGIOMA OF SKIN AND SUBCUTANEOUS TISSUE: Status: ACTIVE | Noted: 2023-02-16

## 2023-02-16 PROCEDURE — 17003 DESTRUCT PREMALG LES 2-14: CPT | Mod: 59

## 2023-02-16 PROCEDURE — 99213 OFFICE O/P EST LOW 20 MIN: CPT | Mod: 25

## 2023-02-16 PROCEDURE — 17110 DESTRUCTION B9 LES UP TO 14: CPT

## 2023-02-16 PROCEDURE — 17000 DESTRUCT PREMALG LESION: CPT | Mod: 59

## 2023-02-16 PROCEDURE — ? COUNSELING

## 2023-02-16 PROCEDURE — ? LIQUID NITROGEN

## 2023-02-16 ASSESSMENT — LOCATION SIMPLE DESCRIPTION DERM
LOCATION SIMPLE: RIGHT CHEEK
LOCATION SIMPLE: LEFT LIP
LOCATION SIMPLE: CHEST
LOCATION SIMPLE: LEFT CHEEK
LOCATION SIMPLE: RIGHT UPPER BACK

## 2023-02-16 ASSESSMENT — LOCATION ZONE DERM
LOCATION ZONE: LIP
LOCATION ZONE: FACE
LOCATION ZONE: TRUNK

## 2023-02-16 ASSESSMENT — LOCATION DETAILED DESCRIPTION DERM
LOCATION DETAILED: LEFT SUPERIOR VERMILION LIP
LOCATION DETAILED: RIGHT MEDIAL UPPER BACK
LOCATION DETAILED: UPPER STERNUM
LOCATION DETAILED: STERNAL NOTCH
LOCATION DETAILED: RIGHT INFERIOR UPPER BACK
LOCATION DETAILED: LEFT INFERIOR CENTRAL MALAR CHEEK
LOCATION DETAILED: RIGHT INFERIOR LATERAL MALAR CHEEK

## 2023-02-16 NOTE — PROCEDURE: LIQUID NITROGEN
Detail Level: Detailed
Render Note In Bullet Format When Appropriate: No
Show Applicator Variable?: Yes
Duration Of Freeze Thaw-Cycle (Seconds): 0
Post-Care Instructions: I reviewed with the patient in detail post-care instructions. Patient is to wear sunprotection, and avoid picking at any of the treated lesions. Pt may apply Vaseline to crusted or scabbing areas.
Consent: The patient's consent was obtained including but not limited to risks of crusting, scabbing, blistering, scarring, darker or lighter pigmentary change, recurrence, incomplete removal and infection.
Spray Paint Text: The liquid nitrogen was applied to the skin utilizing a spray paint frosting technique.
Medical Necessity Information: It is in your best interest to select a reason for this procedure from the list below. All of these items fulfill various CMS LCD requirements except the new and changing color options.
Medical Necessity Clause: This procedure was medically necessary because the lesions that were treated were:

## 2023-04-28 ENCOUNTER — HOSPITAL ENCOUNTER (OUTPATIENT)
Dept: LAB | Facility: MEDICAL CENTER | Age: 68
End: 2023-04-28
Attending: FAMILY MEDICINE
Payer: MEDICARE

## 2023-04-28 LAB
ALBUMIN SERPL BCP-MCNC: 4.5 G/DL (ref 3.2–4.9)
ALBUMIN/GLOB SERPL: 1.7 G/DL
ALP SERPL-CCNC: 101 U/L (ref 30–99)
ALT SERPL-CCNC: 16 U/L (ref 2–50)
ANION GAP SERPL CALC-SCNC: 11 MMOL/L (ref 7–16)
AST SERPL-CCNC: 20 U/L (ref 12–45)
BASOPHILS # BLD AUTO: 1 % (ref 0–1.8)
BASOPHILS # BLD: 0.09 K/UL (ref 0–0.12)
BILIRUB SERPL-MCNC: 0.2 MG/DL (ref 0.1–1.5)
BUN SERPL-MCNC: 18 MG/DL (ref 8–22)
CALCIUM ALBUM COR SERPL-MCNC: 9.2 MG/DL (ref 8.5–10.5)
CALCIUM SERPL-MCNC: 9.6 MG/DL (ref 8.5–10.5)
CHLORIDE SERPL-SCNC: 103 MMOL/L (ref 96–112)
CO2 SERPL-SCNC: 25 MMOL/L (ref 20–33)
CREAT SERPL-MCNC: 0.72 MG/DL (ref 0.5–1.4)
CRP SERPL HS-MCNC: 0.4 MG/DL (ref 0–0.75)
EOSINOPHIL # BLD AUTO: 0.13 K/UL (ref 0–0.51)
EOSINOPHIL NFR BLD: 1.5 % (ref 0–6.9)
ERYTHROCYTE [DISTWIDTH] IN BLOOD BY AUTOMATED COUNT: 52.5 FL (ref 35.9–50)
ERYTHROCYTE [SEDIMENTATION RATE] IN BLOOD BY WESTERGREN METHOD: 6 MM/HOUR (ref 0–25)
EST. AVERAGE GLUCOSE BLD GHB EST-MCNC: 111 MG/DL
GFR SERPLBLD CREATININE-BSD FMLA CKD-EPI: 91 ML/MIN/1.73 M 2
GLOBULIN SER CALC-MCNC: 2.7 G/DL (ref 1.9–3.5)
GLUCOSE SERPL-MCNC: 91 MG/DL (ref 65–99)
HBA1C MFR BLD: 5.5 % (ref 4–5.6)
HCT VFR BLD AUTO: 45.3 % (ref 37–47)
HGB BLD-MCNC: 14.8 G/DL (ref 12–16)
IMM GRANULOCYTES # BLD AUTO: 0.03 K/UL (ref 0–0.11)
IMM GRANULOCYTES NFR BLD AUTO: 0.3 % (ref 0–0.9)
IRON SATN MFR SERPL: 19 % (ref 15–55)
IRON SERPL-MCNC: 62 UG/DL (ref 40–170)
LYMPHOCYTES # BLD AUTO: 2.39 K/UL (ref 1–4.8)
LYMPHOCYTES NFR BLD: 27.5 % (ref 22–41)
MCH RBC QN AUTO: 30.3 PG (ref 27–33)
MCHC RBC AUTO-ENTMCNC: 32.7 G/DL (ref 33.6–35)
MCV RBC AUTO: 92.6 FL (ref 81.4–97.8)
MONOCYTES # BLD AUTO: 0.81 K/UL (ref 0–0.85)
MONOCYTES NFR BLD AUTO: 9.3 % (ref 0–13.4)
NEUTROPHILS # BLD AUTO: 5.25 K/UL (ref 2–7.15)
NEUTROPHILS NFR BLD: 60.4 % (ref 44–72)
NRBC # BLD AUTO: 0 K/UL
NRBC BLD-RTO: 0 /100 WBC
PLATELET # BLD AUTO: 329 K/UL (ref 164–446)
PMV BLD AUTO: 9.2 FL (ref 9–12.9)
POTASSIUM SERPL-SCNC: 4.3 MMOL/L (ref 3.6–5.5)
PROT SERPL-MCNC: 7.2 G/DL (ref 6–8.2)
RBC # BLD AUTO: 4.89 M/UL (ref 4.2–5.4)
SODIUM SERPL-SCNC: 139 MMOL/L (ref 135–145)
TIBC SERPL-MCNC: 328 UG/DL (ref 250–450)
UIBC SERPL-MCNC: 266 UG/DL (ref 110–370)
WBC # BLD AUTO: 8.7 K/UL (ref 4.8–10.8)

## 2023-04-28 PROCEDURE — 83036 HEMOGLOBIN GLYCOSYLATED A1C: CPT | Mod: GA

## 2023-04-28 PROCEDURE — 86140 C-REACTIVE PROTEIN: CPT

## 2023-04-28 PROCEDURE — 85652 RBC SED RATE AUTOMATED: CPT

## 2023-04-28 PROCEDURE — 84480 ASSAY TRIIODOTHYRONINE (T3): CPT

## 2023-04-28 PROCEDURE — 83540 ASSAY OF IRON: CPT | Mod: GA

## 2023-04-28 PROCEDURE — 82306 VITAMIN D 25 HYDROXY: CPT

## 2023-04-28 PROCEDURE — 83550 IRON BINDING TEST: CPT | Mod: GA

## 2023-04-28 PROCEDURE — 82607 VITAMIN B-12: CPT

## 2023-04-28 PROCEDURE — 84443 ASSAY THYROID STIM HORMONE: CPT

## 2023-04-28 PROCEDURE — 36415 COLL VENOUS BLD VENIPUNCTURE: CPT

## 2023-04-28 PROCEDURE — 82728 ASSAY OF FERRITIN: CPT | Mod: GA

## 2023-04-28 PROCEDURE — 85025 COMPLETE CBC W/AUTO DIFF WBC: CPT

## 2023-04-28 PROCEDURE — 82746 ASSAY OF FOLIC ACID SERUM: CPT

## 2023-04-28 PROCEDURE — 84436 ASSAY OF TOTAL THYROXINE: CPT

## 2023-04-28 PROCEDURE — 80053 COMPREHEN METABOLIC PANEL: CPT

## 2023-04-29 LAB
25(OH)D3 SERPL-MCNC: 55 NG/ML (ref 30–100)
FERRITIN SERPL-MCNC: 80.1 NG/ML (ref 10–291)
FOLATE SERPL-MCNC: 9.6 NG/ML
T3 SERPL-MCNC: 93.6 NG/DL (ref 60–181)
T4 SERPL-MCNC: 5.9 UG/DL (ref 4–12)
TSH SERPL DL<=0.005 MIU/L-ACNC: 3.9 UIU/ML (ref 0.38–5.33)
VIT B12 SERPL-MCNC: >4000 PG/ML (ref 211–911)

## 2023-05-30 NOTE — PATIENT INSTRUCTIONS
Pharmacokinetic Assessment Follow Up: IV Vancomycin    Vancomycin serum concentration assessment(s):    The trough level was drawn correctly and can be used to guide therapy at this time. The measurement is below the desired definitive target range of 10 to 20 mcg/mL.    Vancomycin Regimen Plan:    Change regimen to Vancomycin 1750 mg IV every 12 hours with next serum trough concentration measured at 0830 prior to 3rd dose on 5/31/2023    Drug levels (last 3 results):  Recent Labs   Lab Result Units 05/30/23  0602   Vancomycin-Trough ug/mL 6.2*       Pharmacy will continue to follow and monitor vancomycin.    Please contact pharmacy at extension [phone] for questions regarding this assessment.    Thank you for the consult,   Reji Rice Jr       Patient brief summary:  Shahida Ortega is a 50 y.o. female initiated on antimicrobial therapy with IV Vancomycin for treatment of sepsis    Drug Allergies:   Review of patient's allergies indicates:   Allergen Reactions    Penicillins Hives    Amoxicillin     Grass pollen-june grass standard Other (See Comments)       Actual Body Weight:   108 kg    Renal Function:   Estimated Creatinine Clearance: 106.5 mL/min (based on SCr of 0.8 mg/dL).,     Dialysis Method (if applicable):  N/A    CBC (last 72 hours):  Recent Labs   Lab Result Units 05/28/23  1650 05/29/23  0348   WBC K/uL 6.38 9.19   Hemoglobin g/dL 12.2 11.3*   Hematocrit % 36.4* 35.1*   Platelets K/uL 118* 120*   Gran % % 80.7* 80.3*   Lymph % % 16.1* 10.8*   Mono % % 1.3* 8.2   Eosinophil % % 0.6 0.2   Basophil % % 0.2 0.2   Differential Method  Automated Automated       Metabolic Panel (last 72 hours):  Recent Labs   Lab Result Units 05/28/23  1650 05/28/23  2054 05/29/23  0348   Sodium mmol/L 137  --  139   Potassium mmol/L 4.1  --  3.2*   Chloride mmol/L 102  --  104   CO2 mmol/L 24  --  28   Glucose mg/dL 226*  --  181*   Glucose, UA   --  4+*  --    BUN mg/dL 13  --  8   Creatinine mg/dL 0.9  --  0.8   Albumin  Sinusitis, Adult  Sinusitis is redness, soreness, and inflammation of the paranasal sinuses. Paranasal sinuses are air pockets within the bones of your face. They are located beneath your eyes, in the middle of your forehead, and above your eyes. In healthy paranasal sinuses, mucus is able to drain out, and air is able to circulate through them by way of your nose. However, when your paranasal sinuses are inflamed, mucus and air can become trapped. This can allow bacteria and other germs to grow and cause infection.  Sinusitis can develop quickly and last only a short time (acute) or continue over a long period (chronic). Sinusitis that lasts for more than 12 weeks is considered chronic.  CAUSES  Causes of sinusitis include:  · Allergies.  · Structural abnormalities, such as displacement of the cartilage that separates your nostrils (deviated septum), which can decrease the air flow through your nose and sinuses and affect sinus drainage.  · Functional abnormalities, such as when the small hairs (cilia) that line your sinuses and help remove mucus do not work properly or are not present.  SIGNS AND SYMPTOMS  Symptoms of acute and chronic sinusitis are the same. The primary symptoms are pain and pressure around the affected sinuses. Other symptoms include:  · Upper toothache.  · Earache.  · Headache.  · Bad breath.  · Decreased sense of smell and taste.  · A cough, which worsens when you are lying flat.  · Fatigue.  · Fever.  · Thick drainage from your nose, which often is green and may contain pus (purulent).  · Swelling and warmth over the affected sinuses.  DIAGNOSIS  Your health care provider will perform a physical exam. During your exam, your health care provider may perform any of the following to help determine if you have acute sinusitis or chronic sinusitis:  · Look in your nose for signs of abnormal growths in your nostrils (nasal polyps).  · Tap over the affected sinus to check for signs of  g/dL 3.4*  --  3.0*   Total Bilirubin mg/dL 0.6  --  0.4   Alkaline Phosphatase U/L 84  --  70   AST U/L 23  --  18   ALT U/L 19  --  16   Magnesium mg/dL  --   --  1.6   Phosphorus mg/dL  --   --  3.7       Vancomycin Administrations:  vancomycin given in the last 96 hours                     vancomycin 1,500 mg in dextrose 5 % (D5W) 250 mL IVPB (Vial-Mate) (mg) 1,500 mg New Bag 05/29/23 1831     1,500 mg New Bag  0712    vancomycin (VANCOCIN) 2,000 mg in dextrose 5 % (D5W) 500 mL IVPB (mg) 2,000 mg New Bag 05/28/23 1911                    Microbiologic Results:  Microbiology Results (last 7 days)       Procedure Component Value Units Date/Time    Blood culture x two cultures. Draw prior to antibiotics. [[id number]]  (Abnormal) Collected: 05/28/23 1703    Order Status: Completed Specimen: Blood from Peripheral, Hand, Left Updated: 05/30/23 0800     Blood Culture, Routine Gram stain cameron bottle: Gram negative rods      Results called to and read back by: Yulia Robin 05/29/2023  07:38      PRESUMPTIVE E COLI  Identification and susceptibility pending      Narrative:      Aerobic and anaerobic    Blood culture x two cultures. Draw prior to antibiotics. [[id number]] Collected: 05/28/23 1647    Order Status: Completed Specimen: Blood from Peripheral, Antecubital, Right Updated: 05/29/23 1703     Blood Culture, Routine No Growth to date      No Growth to date    Narrative:      Aerobic and anaerobic    Blood culture [[id number]] Collected: 05/29/23 0834    Order Status: Completed Specimen: Blood Updated: 05/29/23 1512     Blood Culture, Routine No Growth to date    Blood culture [[id number]] Collected: 05/29/23 0834    Order Status: Completed Specimen: Blood Updated: 05/29/23 1512     Blood Culture, Routine No Growth to date    Urine Culture High Risk [[id number]] Collected: 05/29/23 1034    Order Status: Sent Specimen: Urine, Clean Catch Updated: 05/29/23 1043           infection.  · View the inside of your sinuses using an imaging device that has a light attached (endoscope).  If your health care provider suspects that you have chronic sinusitis, one or more of the following tests may be recommended:  · Allergy tests.  · Nasal culture. A sample of mucus is taken from your nose, sent to a lab, and screened for bacteria.  · Nasal cytology. A sample of mucus is taken from your nose and examined by your health care provider to determine if your sinusitis is related to an allergy.  TREATMENT  Most cases of acute sinusitis are related to a viral infection and will resolve on their own within 10 days. Sometimes, medicines are prescribed to help relieve symptoms of both acute and chronic sinusitis. These may include pain medicines, decongestants, nasal steroid sprays, or saline sprays.  However, for sinusitis related to a bacterial infection, your health care provider will prescribe antibiotic medicines. These are medicines that will help kill the bacteria causing the infection.  Rarely, sinusitis is caused by a fungal infection. In these cases, your health care provider will prescribe antifungal medicine.  For some cases of chronic sinusitis, surgery is needed. Generally, these are cases in which sinusitis recurs more than 3 times per year, despite other treatments.  HOME CARE INSTRUCTIONS  · Drink plenty of water. Water helps thin the mucus so your sinuses can drain more easily.  · Use a humidifier.  · Inhale steam 3-4 times a day (for example, sit in the bathroom with the shower running).  · Apply a warm, moist washcloth to your face 3-4 times a day, or as directed by your health care provider.  · Use saline nasal sprays to help moisten and clean your sinuses.  · Take medicines only as directed by your health care provider.  · If you were prescribed either an antibiotic or antifungal medicine, finish it all even if you start to feel better.  SEEK IMMEDIATE MEDICAL CARE IF:  · You have  increasing pain or severe headaches.  · You have nausea, vomiting, or drowsiness.  · You have swelling around your face.  · You have vision problems.  · You have a stiff neck.  · You have difficulty breathing.     This information is not intended to replace advice given to you by your health care provider. Make sure you discuss any questions you have with your health care provider.     Document Released: 12/18/2006 Document Revised: 01/08/2016 Document Reviewed: 01/01/2013  [employer] Interactive Patient Education ©2016 [employer]

## 2023-08-10 ENCOUNTER — APPOINTMENT (RX ONLY)
Dept: URBAN - METROPOLITAN AREA CLINIC 6 | Facility: CLINIC | Age: 68
Setting detail: DERMATOLOGY
End: 2023-08-10

## 2023-08-10 DIAGNOSIS — L82.1 OTHER SEBORRHEIC KERATOSIS: ICD-10-CM

## 2023-08-10 DIAGNOSIS — D18.0 HEMANGIOMA: ICD-10-CM

## 2023-08-10 DIAGNOSIS — L30.9 DERMATITIS, UNSPECIFIED: ICD-10-CM

## 2023-08-10 PROBLEM — D18.01 HEMANGIOMA OF SKIN AND SUBCUTANEOUS TISSUE: Status: ACTIVE | Noted: 2023-08-10

## 2023-08-10 PROCEDURE — ? TREATMENT REGIMEN

## 2023-08-10 PROCEDURE — ? PRESCRIPTION

## 2023-08-10 PROCEDURE — 99213 OFFICE O/P EST LOW 20 MIN: CPT

## 2023-08-10 PROCEDURE — ? ADDITIONAL NOTES

## 2023-08-10 PROCEDURE — ? COUNSELING

## 2023-08-10 RX ORDER — TRIAMCINOLONE ACETONIDE 1 MG/ML
THIN LAYER LOTION TOPICAL BID
Qty: 60 | Refills: 2 | Status: ERX | COMMUNITY
Start: 2023-08-10

## 2023-08-10 RX ADMIN — TRIAMCINOLONE ACETONIDE THIN LAYER: 1 LOTION TOPICAL at 00:00

## 2023-08-10 ASSESSMENT — LOCATION DETAILED DESCRIPTION DERM
LOCATION DETAILED: MIDDLE STERNUM
LOCATION DETAILED: UPPER STERNUM

## 2023-08-10 ASSESSMENT — LOCATION ZONE DERM: LOCATION ZONE: TRUNK

## 2023-08-10 ASSESSMENT — LOCATION SIMPLE DESCRIPTION DERM: LOCATION SIMPLE: CHEST

## 2023-08-10 NOTE — PROCEDURE: TREATMENT REGIMEN
Detail Level: Simple
Initiate Treatment: Triamcinolone as needed for flares\\nKeep skin routine minimal. Recommend cetaphil or cerave.  If rash stays clear can work in her normal products one by one.\\nApply mineral based sunscreen when she is in the sun.\\nSamples of sunscreens and moisturizers given.

## 2023-08-10 NOTE — HPI: RASH
What Type Of Note Output Would You Prefer (Optional)?: Standard Output
Is The Patient Presenting As Previously Scheduled?: No, they are a work-in
How Severe Is Your Rash?: mild
Is This A New Presentation, Or A Follow-Up?: Rash
Additional History: Started after sunburn.  Comes and goes.

## 2023-08-10 NOTE — PROCEDURE: ADDITIONAL NOTES
Additional Notes: Essentially clear today. Started with sun about 6 weeks ago then recurring without clear trigger. Discussed photodermatitis vs allergic contact dermatitis. No new medications, or personal products. Denies any blisters.\\nWe advised sun protection, gentle skin care\\nUse triamcinolone prn
Detail Level: Simple
Render Risk Assessment In Note?: yes

## 2024-01-19 ENCOUNTER — HOSPITAL ENCOUNTER (OUTPATIENT)
Dept: RADIOLOGY | Facility: MEDICAL CENTER | Age: 69
End: 2024-01-19
Attending: FAMILY MEDICINE
Payer: MEDICARE

## 2024-01-19 DIAGNOSIS — Z78.0 MENOPAUSE: ICD-10-CM

## 2024-01-19 PROCEDURE — 77080 DXA BONE DENSITY AXIAL: CPT

## 2024-02-07 ENCOUNTER — HOSPITAL ENCOUNTER (OUTPATIENT)
Dept: RADIOLOGY | Facility: MEDICAL CENTER | Age: 69
End: 2024-02-07
Attending: FAMILY MEDICINE
Payer: MEDICARE

## 2024-02-07 DIAGNOSIS — Z12.31 ENCOUNTER FOR SCREENING MAMMOGRAM FOR MALIGNANT NEOPLASM OF BREAST: ICD-10-CM

## 2024-02-07 PROCEDURE — 77067 SCR MAMMO BI INCL CAD: CPT

## 2024-05-24 ENCOUNTER — APPOINTMENT (OUTPATIENT)
Dept: URGENT CARE | Facility: PHYSICIAN GROUP | Age: 69
End: 2024-05-24
Payer: MEDICARE

## 2024-10-03 ENCOUNTER — OFFICE VISIT (OUTPATIENT)
Dept: URGENT CARE | Facility: PHYSICIAN GROUP | Age: 69
End: 2024-10-03
Payer: MEDICARE

## 2024-10-03 VITALS
SYSTOLIC BLOOD PRESSURE: 124 MMHG | RESPIRATION RATE: 16 BRPM | DIASTOLIC BLOOD PRESSURE: 88 MMHG | TEMPERATURE: 99.2 F | BODY MASS INDEX: 29.69 KG/M2 | HEIGHT: 63 IN | OXYGEN SATURATION: 98 % | HEART RATE: 84 BPM | WEIGHT: 167.55 LBS

## 2024-10-03 DIAGNOSIS — B96.89 BACTERIAL LOWER RESPIRATORY INFECTION: ICD-10-CM

## 2024-10-03 DIAGNOSIS — J45.901 EXACERBATION OF ASTHMA, UNSPECIFIED ASTHMA SEVERITY, UNSPECIFIED WHETHER PERSISTENT: ICD-10-CM

## 2024-10-03 DIAGNOSIS — R06.2 WHEEZING: ICD-10-CM

## 2024-10-03 DIAGNOSIS — J22 BACTERIAL LOWER RESPIRATORY INFECTION: ICD-10-CM

## 2024-10-03 PROCEDURE — 94761 N-INVAS EAR/PLS OXIMETRY MLT: CPT | Performed by: REGISTERED NURSE

## 2024-10-03 PROCEDURE — 99214 OFFICE O/P EST MOD 30 MIN: CPT | Mod: 25 | Performed by: REGISTERED NURSE

## 2024-10-03 PROCEDURE — 3079F DIAST BP 80-89 MM HG: CPT | Performed by: REGISTERED NURSE

## 2024-10-03 PROCEDURE — 3074F SYST BP LT 130 MM HG: CPT | Performed by: REGISTERED NURSE

## 2024-10-03 RX ORDER — FLUTICASONE FUROATE AND VILANTEROL 200; 25 UG/1; UG/1
1 POWDER RESPIRATORY (INHALATION) DAILY
COMMUNITY

## 2024-10-03 RX ORDER — ALBUTEROL SULFATE 0.83 MG/ML
2.5 SOLUTION RESPIRATORY (INHALATION) EVERY 4 HOURS PRN
Qty: 30 EACH | Refills: 0 | Status: SHIPPED | OUTPATIENT
Start: 2024-10-03

## 2024-10-03 RX ORDER — PREDNISONE 10 MG/1
20 TABLET ORAL DAILY
Qty: 10 TABLET | Refills: 0 | Status: SHIPPED | OUTPATIENT
Start: 2024-10-03 | End: 2024-10-08

## 2024-10-03 RX ORDER — ALBUTEROL SULFATE 0.83 MG/ML
2.5 SOLUTION RESPIRATORY (INHALATION) ONCE
Status: SHIPPED | OUTPATIENT
Start: 2024-10-03 | End: 2024-10-06

## 2024-10-03 RX ORDER — DOXYCYCLINE HYCLATE 100 MG
100 TABLET ORAL 2 TIMES DAILY
Qty: 14 TABLET | Refills: 0 | Status: SHIPPED | OUTPATIENT
Start: 2024-10-03 | End: 2024-10-10

## 2024-10-03 ASSESSMENT — ENCOUNTER SYMPTOMS
SHORTNESS OF BREATH: 0
DIZZINESS: 0
ABDOMINAL PAIN: 0
FEVER: 0
HEMOPTYSIS: 0

## 2024-10-03 ASSESSMENT — FIBROSIS 4 INDEX: FIB4 SCORE: 1.05

## 2024-10-07 ENCOUNTER — HOSPITAL ENCOUNTER (OUTPATIENT)
Dept: RADIOLOGY | Facility: MEDICAL CENTER | Age: 69
End: 2024-10-07
Attending: NURSE PRACTITIONER
Payer: MEDICARE

## 2024-10-07 ENCOUNTER — OFFICE VISIT (OUTPATIENT)
Dept: URGENT CARE | Facility: PHYSICIAN GROUP | Age: 69
End: 2024-10-07
Payer: MEDICARE

## 2024-10-07 VITALS
HEIGHT: 63 IN | HEART RATE: 107 BPM | DIASTOLIC BLOOD PRESSURE: 90 MMHG | OXYGEN SATURATION: 96 % | BODY MASS INDEX: 29.69 KG/M2 | TEMPERATURE: 98.4 F | WEIGHT: 167.55 LBS | SYSTOLIC BLOOD PRESSURE: 132 MMHG | RESPIRATION RATE: 22 BRPM

## 2024-10-07 DIAGNOSIS — J22 LOWER RESPIRATORY TRACT INFECTION: ICD-10-CM

## 2024-10-07 PROCEDURE — 99214 OFFICE O/P EST MOD 30 MIN: CPT | Performed by: NURSE PRACTITIONER

## 2024-10-07 PROCEDURE — 3075F SYST BP GE 130 - 139MM HG: CPT | Performed by: NURSE PRACTITIONER

## 2024-10-07 PROCEDURE — 71046 X-RAY EXAM CHEST 2 VIEWS: CPT

## 2024-10-07 PROCEDURE — 3080F DIAST BP >= 90 MM HG: CPT | Performed by: NURSE PRACTITIONER

## 2024-10-07 RX ORDER — BENZONATATE 100 MG/1
100 CAPSULE ORAL 3 TIMES DAILY PRN
Qty: 30 CAPSULE | Refills: 0 | Status: SHIPPED | OUTPATIENT
Start: 2024-10-07 | End: 2024-10-17

## 2024-10-07 ASSESSMENT — FIBROSIS 4 INDEX: FIB4 SCORE: 1.05

## 2024-10-28 ENCOUNTER — HOSPITAL ENCOUNTER (OUTPATIENT)
Dept: RADIOLOGY | Facility: MEDICAL CENTER | Age: 69
End: 2024-10-28
Payer: MEDICARE

## 2024-10-28 ENCOUNTER — OFFICE VISIT (OUTPATIENT)
Dept: URGENT CARE | Facility: PHYSICIAN GROUP | Age: 69
End: 2024-10-28
Payer: MEDICARE

## 2024-10-28 VITALS
OXYGEN SATURATION: 95 % | BODY MASS INDEX: 29.77 KG/M2 | TEMPERATURE: 98.9 F | RESPIRATION RATE: 18 BRPM | SYSTOLIC BLOOD PRESSURE: 126 MMHG | WEIGHT: 167.99 LBS | DIASTOLIC BLOOD PRESSURE: 84 MMHG | HEIGHT: 63 IN | HEART RATE: 98 BPM

## 2024-10-28 DIAGNOSIS — R05.1 ACUTE COUGH: ICD-10-CM

## 2024-10-28 DIAGNOSIS — M54.6 ACUTE BILATERAL THORACIC BACK PAIN: ICD-10-CM

## 2024-10-28 LAB
FLUAV RNA SPEC QL NAA+PROBE: NEGATIVE
FLUBV RNA SPEC QL NAA+PROBE: NEGATIVE
RSV RNA SPEC QL NAA+PROBE: NEGATIVE
SARS-COV-2 RNA RESP QL NAA+PROBE: NEGATIVE

## 2024-10-28 PROCEDURE — 99214 OFFICE O/P EST MOD 30 MIN: CPT | Mod: 25

## 2024-10-28 PROCEDURE — 0241U POCT CEPHEID COV-2, FLU A/B, RSV - PCR: CPT | Mod: QW

## 2024-10-28 PROCEDURE — 3074F SYST BP LT 130 MM HG: CPT

## 2024-10-28 PROCEDURE — 71046 X-RAY EXAM CHEST 2 VIEWS: CPT

## 2024-10-28 PROCEDURE — 3079F DIAST BP 80-89 MM HG: CPT

## 2024-10-28 RX ORDER — BENZONATATE 100 MG/1
100 CAPSULE ORAL 3 TIMES DAILY PRN
Qty: 60 CAPSULE | Refills: 0 | Status: SHIPPED | OUTPATIENT
Start: 2024-10-28

## 2024-10-28 RX ORDER — METHYLPREDNISOLONE 4 MG/1
TABLET ORAL
Qty: 21 TABLET | Refills: 0 | Status: SHIPPED | OUTPATIENT
Start: 2024-10-28

## 2024-10-28 ASSESSMENT — ENCOUNTER SYMPTOMS
SORE THROAT: 0
BACK PAIN: 1
FEVER: 0
COUGH: 1
SPUTUM PRODUCTION: 1

## 2024-10-28 ASSESSMENT — FIBROSIS 4 INDEX: FIB4 SCORE: 1.05

## 2024-11-21 ENCOUNTER — HOSPITAL ENCOUNTER (OUTPATIENT)
Facility: MEDICAL CENTER | Age: 69
End: 2024-11-22
Attending: INTERNAL MEDICINE | Admitting: STUDENT IN AN ORGANIZED HEALTH CARE EDUCATION/TRAINING PROGRAM
Payer: MEDICARE

## 2024-11-21 ENCOUNTER — APPOINTMENT (OUTPATIENT)
Dept: RADIOLOGY | Facility: MEDICAL CENTER | Age: 69
End: 2024-11-21
Attending: STUDENT IN AN ORGANIZED HEALTH CARE EDUCATION/TRAINING PROGRAM
Payer: MEDICARE

## 2024-11-21 DIAGNOSIS — G47.33 OSA (OBSTRUCTIVE SLEEP APNEA): ICD-10-CM

## 2024-11-21 DIAGNOSIS — R42 VERTIGO: ICD-10-CM

## 2024-11-21 LAB
ALBUMIN SERPL BCP-MCNC: 4.1 G/DL (ref 3.2–4.9)
ALBUMIN/GLOB SERPL: 1.5 G/DL
ALP SERPL-CCNC: 103 U/L (ref 30–99)
ALT SERPL-CCNC: 15 U/L (ref 2–50)
ANION GAP SERPL CALC-SCNC: 12 MMOL/L (ref 7–16)
APPEARANCE UR: CLEAR
AST SERPL-CCNC: 21 U/L (ref 12–45)
BASOPHILS # BLD AUTO: 0.5 % (ref 0–1.8)
BASOPHILS # BLD: 0.07 K/UL (ref 0–0.12)
BILIRUB SERPL-MCNC: 0.3 MG/DL (ref 0.1–1.5)
BILIRUB UR QL STRIP.AUTO: NEGATIVE
BUN SERPL-MCNC: 17 MG/DL (ref 8–22)
CALCIUM ALBUM COR SERPL-MCNC: 8.7 MG/DL (ref 8.5–10.5)
CALCIUM SERPL-MCNC: 8.8 MG/DL (ref 8.5–10.5)
CHLORIDE SERPL-SCNC: 99 MMOL/L (ref 96–112)
CO2 SERPL-SCNC: 25 MMOL/L (ref 20–33)
COLOR UR: YELLOW
CREAT SERPL-MCNC: 0.56 MG/DL (ref 0.5–1.4)
EOSINOPHIL # BLD AUTO: 0.01 K/UL (ref 0–0.51)
EOSINOPHIL NFR BLD: 0.1 % (ref 0–6.9)
ERYTHROCYTE [DISTWIDTH] IN BLOOD BY AUTOMATED COUNT: 49.8 FL (ref 35.9–50)
GFR SERPLBLD CREATININE-BSD FMLA CKD-EPI: 99 ML/MIN/1.73 M 2
GLOBULIN SER CALC-MCNC: 2.7 G/DL (ref 1.9–3.5)
GLUCOSE SERPL-MCNC: 117 MG/DL (ref 65–99)
GLUCOSE UR STRIP.AUTO-MCNC: NEGATIVE MG/DL
HCT VFR BLD AUTO: 41.5 % (ref 37–47)
HGB BLD-MCNC: 14 G/DL (ref 12–16)
IMM GRANULOCYTES # BLD AUTO: 0.04 K/UL (ref 0–0.11)
IMM GRANULOCYTES NFR BLD AUTO: 0.3 % (ref 0–0.9)
KETONES UR STRIP.AUTO-MCNC: NEGATIVE MG/DL
LEUKOCYTE ESTERASE UR QL STRIP.AUTO: NEGATIVE
LYMPHOCYTES # BLD AUTO: 1.24 K/UL (ref 1–4.8)
LYMPHOCYTES NFR BLD: 9.7 % (ref 22–41)
MAGNESIUM SERPL-MCNC: 2.2 MG/DL (ref 1.5–2.5)
MCH RBC QN AUTO: 29.5 PG (ref 27–33)
MCHC RBC AUTO-ENTMCNC: 33.7 G/DL (ref 32.2–35.5)
MCV RBC AUTO: 87.4 FL (ref 81.4–97.8)
MICRO URNS: NORMAL
MONOCYTES # BLD AUTO: 0.71 K/UL (ref 0–0.85)
MONOCYTES NFR BLD AUTO: 5.6 % (ref 0–13.4)
NEUTROPHILS # BLD AUTO: 10.7 K/UL (ref 1.82–7.42)
NEUTROPHILS NFR BLD: 83.8 % (ref 44–72)
NITRITE UR QL STRIP.AUTO: NEGATIVE
NRBC # BLD AUTO: 0 K/UL
NRBC BLD-RTO: 0 /100 WBC (ref 0–0.2)
PH UR STRIP.AUTO: 6 [PH] (ref 5–8)
PHOSPHATE SERPL-MCNC: 3.4 MG/DL (ref 2.5–4.5)
PLATELET # BLD AUTO: 335 K/UL (ref 164–446)
PMV BLD AUTO: 8.9 FL (ref 9–12.9)
POTASSIUM SERPL-SCNC: 3.8 MMOL/L (ref 3.6–5.5)
PROT SERPL-MCNC: 6.8 G/DL (ref 6–8.2)
PROT UR QL STRIP: NEGATIVE MG/DL
RBC # BLD AUTO: 4.75 M/UL (ref 4.2–5.4)
RBC UR QL AUTO: NEGATIVE
SODIUM SERPL-SCNC: 136 MMOL/L (ref 135–145)
SP GR UR STRIP.AUTO: 1.01
UROBILINOGEN UR STRIP.AUTO-MCNC: 0.2 EU/DL
WBC # BLD AUTO: 12.8 K/UL (ref 4.8–10.8)

## 2024-11-21 PROCEDURE — 99223 1ST HOSP IP/OBS HIGH 75: CPT | Performed by: STUDENT IN AN ORGANIZED HEALTH CARE EDUCATION/TRAINING PROGRAM

## 2024-11-21 PROCEDURE — 80053 COMPREHEN METABOLIC PANEL: CPT

## 2024-11-21 PROCEDURE — 96374 THER/PROPH/DIAG INJ IV PUSH: CPT

## 2024-11-21 PROCEDURE — G0378 HOSPITAL OBSERVATION PER HR: HCPCS

## 2024-11-21 PROCEDURE — 83735 ASSAY OF MAGNESIUM: CPT

## 2024-11-21 PROCEDURE — 70551 MRI BRAIN STEM W/O DYE: CPT

## 2024-11-21 PROCEDURE — 85025 COMPLETE CBC W/AUTO DIFF WBC: CPT

## 2024-11-21 PROCEDURE — 36415 COLL VENOUS BLD VENIPUNCTURE: CPT

## 2024-11-21 PROCEDURE — 700111 HCHG RX REV CODE 636 W/ 250 OVERRIDE (IP): Mod: JZ | Performed by: STUDENT IN AN ORGANIZED HEALTH CARE EDUCATION/TRAINING PROGRAM

## 2024-11-21 PROCEDURE — 96375 TX/PRO/DX INJ NEW DRUG ADDON: CPT

## 2024-11-21 PROCEDURE — 84100 ASSAY OF PHOSPHORUS: CPT

## 2024-11-21 PROCEDURE — 81003 URINALYSIS AUTO W/O SCOPE: CPT

## 2024-11-21 RX ORDER — AMOXICILLIN 250 MG
2 CAPSULE ORAL EVERY EVENING
Status: DISCONTINUED | OUTPATIENT
Start: 2024-11-21 | End: 2024-11-22 | Stop reason: HOSPADM

## 2024-11-21 RX ORDER — METOCLOPRAMIDE HYDROCHLORIDE 5 MG/ML
10 INJECTION INTRAMUSCULAR; INTRAVENOUS EVERY 6 HOURS PRN
Status: DISCONTINUED | OUTPATIENT
Start: 2024-11-21 | End: 2024-11-22 | Stop reason: HOSPADM

## 2024-11-21 RX ORDER — LABETALOL HYDROCHLORIDE 5 MG/ML
10 INJECTION, SOLUTION INTRAVENOUS EVERY 4 HOURS PRN
Status: DISCONTINUED | OUTPATIENT
Start: 2024-11-21 | End: 2024-11-22 | Stop reason: HOSPADM

## 2024-11-21 RX ORDER — ACETAMINOPHEN 325 MG/1
650 TABLET ORAL EVERY 6 HOURS PRN
Status: DISCONTINUED | OUTPATIENT
Start: 2024-11-21 | End: 2024-11-21

## 2024-11-21 RX ORDER — LABETALOL HYDROCHLORIDE 5 MG/ML
10 INJECTION, SOLUTION INTRAVENOUS EVERY 4 HOURS PRN
Status: DISCONTINUED | OUTPATIENT
Start: 2024-11-21 | End: 2024-11-21

## 2024-11-21 RX ORDER — MECLIZINE HYDROCHLORIDE 25 MG/1
25 TABLET ORAL 3 TIMES DAILY PRN
Status: DISCONTINUED | OUTPATIENT
Start: 2024-11-21 | End: 2024-11-22 | Stop reason: HOSPADM

## 2024-11-21 RX ORDER — ACETAMINOPHEN 325 MG/1
650 TABLET ORAL EVERY 6 HOURS PRN
Status: DISCONTINUED | OUTPATIENT
Start: 2024-11-21 | End: 2024-11-22 | Stop reason: HOSPADM

## 2024-11-21 RX ORDER — ONDANSETRON 2 MG/ML
4 INJECTION INTRAMUSCULAR; INTRAVENOUS EVERY 4 HOURS PRN
Status: DISCONTINUED | OUTPATIENT
Start: 2024-11-21 | End: 2024-11-22 | Stop reason: HOSPADM

## 2024-11-21 RX ORDER — ENOXAPARIN SODIUM 100 MG/ML
40 INJECTION SUBCUTANEOUS DAILY
Status: DISCONTINUED | OUTPATIENT
Start: 2024-11-21 | End: 2024-11-22 | Stop reason: HOSPADM

## 2024-11-21 RX ORDER — DIPHENHYDRAMINE HYDROCHLORIDE 50 MG/ML
25 INJECTION INTRAMUSCULAR; INTRAVENOUS EVERY 6 HOURS PRN
Status: DISCONTINUED | OUTPATIENT
Start: 2024-11-21 | End: 2024-11-22 | Stop reason: HOSPADM

## 2024-11-21 RX ORDER — ONDANSETRON 2 MG/ML
4 INJECTION INTRAMUSCULAR; INTRAVENOUS EVERY 4 HOURS PRN
Status: DISCONTINUED | OUTPATIENT
Start: 2024-11-21 | End: 2024-11-21

## 2024-11-21 RX ORDER — ONDANSETRON 4 MG/1
4 TABLET, ORALLY DISINTEGRATING ORAL EVERY 4 HOURS PRN
Status: DISCONTINUED | OUTPATIENT
Start: 2024-11-21 | End: 2024-11-22 | Stop reason: HOSPADM

## 2024-11-21 RX ORDER — POLYETHYLENE GLYCOL 3350 17 G/17G
1 POWDER, FOR SOLUTION ORAL
Status: DISCONTINUED | OUTPATIENT
Start: 2024-11-21 | End: 2024-11-22 | Stop reason: HOSPADM

## 2024-11-21 RX ADMIN — DIPHENHYDRAMINE HYDROCHLORIDE 25 MG: 50 INJECTION, SOLUTION INTRAMUSCULAR; INTRAVENOUS at 21:59

## 2024-11-21 RX ADMIN — METOCLOPRAMIDE 10 MG: 5 INJECTION, SOLUTION INTRAMUSCULAR; INTRAVENOUS at 21:59

## 2024-11-21 SDOH — ECONOMIC STABILITY: TRANSPORTATION INSECURITY
IN THE PAST 12 MONTHS, HAS LACK OF RELIABLE TRANSPORTATION KEPT YOU FROM MEDICAL APPOINTMENTS, MEETINGS, WORK OR FROM GETTING THINGS NEEDED FOR DAILY LIVING?: PATIENT DECLINED

## 2024-11-21 SDOH — ECONOMIC STABILITY: TRANSPORTATION INSECURITY
IN THE PAST 12 MONTHS, HAS THE LACK OF TRANSPORTATION KEPT YOU FROM MEDICAL APPOINTMENTS OR FROM GETTING MEDICATIONS?: PATIENT DECLINED

## 2024-11-21 ASSESSMENT — SOCIAL DETERMINANTS OF HEALTH (SDOH)
WITHIN THE PAST 12 MONTHS, THE FOOD YOU BOUGHT JUST DIDN'T LAST AND YOU DIDN'T HAVE MONEY TO GET MORE: PATIENT DECLINED
WITHIN THE LAST YEAR, HAVE YOU BEEN HUMILIATED OR EMOTIONALLY ABUSED IN OTHER WAYS BY YOUR PARTNER OR EX-PARTNER?: PATIENT DECLINED
WITHIN THE PAST 12 MONTHS, YOU WORRIED THAT YOUR FOOD WOULD RUN OUT BEFORE YOU GOT THE MONEY TO BUY MORE: PATIENT DECLINED
IN THE PAST 12 MONTHS, HAS THE ELECTRIC, GAS, OIL, OR WATER COMPANY THREATENED TO SHUT OFF SERVICE IN YOUR HOME?: PATIENT DECLINED
WITHIN THE LAST YEAR, HAVE TO BEEN RAPED OR FORCED TO HAVE ANY KIND OF SEXUAL ACTIVITY BY YOUR PARTNER OR EX-PARTNER?: PATIENT DECLINED
WITHIN THE LAST YEAR, HAVE YOU BEEN AFRAID OF YOUR PARTNER OR EX-PARTNER?: PATIENT DECLINED
WITHIN THE LAST YEAR, HAVE YOU BEEN KICKED, HIT, SLAPPED, OR OTHERWISE PHYSICALLY HURT BY YOUR PARTNER OR EX-PARTNER?: PATIENT DECLINED

## 2024-11-21 ASSESSMENT — PAIN DESCRIPTION - PAIN TYPE: TYPE: ACUTE PAIN

## 2024-11-21 ASSESSMENT — PATIENT HEALTH QUESTIONNAIRE - PHQ9
1. LITTLE INTEREST OR PLEASURE IN DOING THINGS: NOT AT ALL
SUM OF ALL RESPONSES TO PHQ9 QUESTIONS 1 AND 2: 0
2. FEELING DOWN, DEPRESSED, IRRITABLE, OR HOPELESS: NOT AT ALL

## 2024-11-21 ASSESSMENT — FIBROSIS 4 INDEX
FIB4 SCORE: 1.05
FIB4 SCORE: 1.05

## 2024-11-22 ENCOUNTER — PHARMACY VISIT (OUTPATIENT)
Dept: PHARMACY | Facility: MEDICAL CENTER | Age: 69
End: 2024-11-22
Payer: COMMERCIAL

## 2024-11-22 ENCOUNTER — APPOINTMENT (OUTPATIENT)
Dept: RADIOLOGY | Facility: MEDICAL CENTER | Age: 69
End: 2024-11-22
Attending: INTERNAL MEDICINE
Payer: MEDICARE

## 2024-11-22 VITALS
SYSTOLIC BLOOD PRESSURE: 133 MMHG | OXYGEN SATURATION: 93 % | HEART RATE: 85 BPM | DIASTOLIC BLOOD PRESSURE: 83 MMHG | RESPIRATION RATE: 16 BRPM | WEIGHT: 171.52 LBS | TEMPERATURE: 98.5 F | HEIGHT: 63 IN | BODY MASS INDEX: 30.39 KG/M2

## 2024-11-22 PROBLEM — E66.9 OBESITY: Status: ACTIVE | Noted: 2024-11-22

## 2024-11-22 PROBLEM — G47.33 OSA (OBSTRUCTIVE SLEEP APNEA): Status: ACTIVE | Noted: 2024-11-22

## 2024-11-22 PROBLEM — D72.829 LEUKOCYTOSIS: Status: ACTIVE | Noted: 2024-11-22

## 2024-11-22 LAB
D DIMER PPP IA.FEU-MCNC: 0.31 UG/ML (FEU) (ref 0–0.5)
ERYTHROCYTE [DISTWIDTH] IN BLOOD BY AUTOMATED COUNT: 51 FL (ref 35.9–50)
HCT VFR BLD AUTO: 41 % (ref 37–47)
HGB BLD-MCNC: 13.6 G/DL (ref 12–16)
MCH RBC QN AUTO: 29.4 PG (ref 27–33)
MCHC RBC AUTO-ENTMCNC: 33.2 G/DL (ref 32.2–35.5)
MCV RBC AUTO: 88.6 FL (ref 81.4–97.8)
PLATELET # BLD AUTO: 333 K/UL (ref 164–446)
PMV BLD AUTO: 9 FL (ref 9–12.9)
RBC # BLD AUTO: 4.63 M/UL (ref 4.2–5.4)
WBC # BLD AUTO: 11.4 K/UL (ref 4.8–10.8)

## 2024-11-22 PROCEDURE — 97163 PT EVAL HIGH COMPLEX 45 MIN: CPT

## 2024-11-22 PROCEDURE — RXMED WILLOW AMBULATORY MEDICATION CHARGE: Performed by: INTERNAL MEDICINE

## 2024-11-22 PROCEDURE — 85027 COMPLETE CBC AUTOMATED: CPT

## 2024-11-22 PROCEDURE — 71045 X-RAY EXAM CHEST 1 VIEW: CPT

## 2024-11-22 PROCEDURE — 700117 HCHG RX CONTRAST REV CODE 255: Performed by: INTERNAL MEDICINE

## 2024-11-22 PROCEDURE — 85379 FIBRIN DEGRADATION QUANT: CPT

## 2024-11-22 PROCEDURE — G0378 HOSPITAL OBSERVATION PER HR: HCPCS

## 2024-11-22 PROCEDURE — 71260 CT THORAX DX C+: CPT

## 2024-11-22 PROCEDURE — 99239 HOSP IP/OBS DSCHRG MGMT >30: CPT | Performed by: INTERNAL MEDICINE

## 2024-11-22 PROCEDURE — 700102 HCHG RX REV CODE 250 W/ 637 OVERRIDE(OP): Performed by: STUDENT IN AN ORGANIZED HEALTH CARE EDUCATION/TRAINING PROGRAM

## 2024-11-22 PROCEDURE — 36415 COLL VENOUS BLD VENIPUNCTURE: CPT

## 2024-11-22 PROCEDURE — 97535 SELF CARE MNGMENT TRAINING: CPT

## 2024-11-22 PROCEDURE — A9270 NON-COVERED ITEM OR SERVICE: HCPCS | Performed by: STUDENT IN AN ORGANIZED HEALTH CARE EDUCATION/TRAINING PROGRAM

## 2024-11-22 RX ORDER — DIPHENHYDRAMINE HCL 25 MG
25 CAPSULE ORAL 4 TIMES DAILY PRN
Qty: 30 CAPSULE | Refills: 0 | Status: SHIPPED | OUTPATIENT
Start: 2024-11-22

## 2024-11-22 RX ORDER — METOCLOPRAMIDE 10 MG/1
10 TABLET ORAL 4 TIMES DAILY PRN
Qty: 30 TABLET | Refills: 0 | Status: SHIPPED | OUTPATIENT
Start: 2024-11-22

## 2024-11-22 RX ORDER — MECLIZINE HYDROCHLORIDE 25 MG/1
25 TABLET ORAL 3 TIMES DAILY PRN
Qty: 30 TABLET | Refills: 0 | Status: SHIPPED | OUTPATIENT
Start: 2024-11-22

## 2024-11-22 RX ORDER — DIAZEPAM 5 MG/1
5 TABLET ORAL EVERY 6 HOURS PRN
Status: DISCONTINUED | OUTPATIENT
Start: 2024-11-22 | End: 2024-11-22 | Stop reason: HOSPADM

## 2024-11-22 RX ADMIN — MECLIZINE HYDROCHLORIDE 25 MG: 25 TABLET ORAL at 14:50

## 2024-11-22 RX ADMIN — IOHEXOL 75 ML: 350 INJECTION, SOLUTION INTRAVENOUS at 15:30

## 2024-11-22 RX ADMIN — ACETAMINOPHEN 650 MG: 325 TABLET ORAL at 14:51

## 2024-11-22 ASSESSMENT — COGNITIVE AND FUNCTIONAL STATUS - GENERAL
MOVING FROM LYING ON BACK TO SITTING ON SIDE OF FLAT BED: A LITTLE
SUGGESTED CMS G CODE MODIFIER MOBILITY: CL
MOVING TO AND FROM BED TO CHAIR: A LOT
DRESSING REGULAR UPPER BODY CLOTHING: A LITTLE
WALKING IN HOSPITAL ROOM: A LITTLE
WALKING IN HOSPITAL ROOM: A LOT
STANDING UP FROM CHAIR USING ARMS: A LITTLE
EATING MEALS: A LITTLE
MOVING TO AND FROM BED TO CHAIR: A LITTLE
MOVING FROM LYING ON BACK TO SITTING ON SIDE OF FLAT BED: A LITTLE
SUGGESTED CMS G CODE MODIFIER DAILY ACTIVITY: CK
DRESSING REGULAR LOWER BODY CLOTHING: A LOT
TURNING FROM BACK TO SIDE WHILE IN FLAT BAD: A LITTLE
MOBILITY SCORE: 19
MOBILITY SCORE: 14
CLIMB 3 TO 5 STEPS WITH RAILING: A LITTLE
DAILY ACTIVITIY SCORE: 16
PERSONAL GROOMING: A LITTLE
HELP NEEDED FOR BATHING: A LITTLE
TOILETING: A LOT
SUGGESTED CMS G CODE MODIFIER MOBILITY: CK
STANDING UP FROM CHAIR USING ARMS: A LOT
CLIMB 3 TO 5 STEPS WITH RAILING: A LOT

## 2024-11-22 ASSESSMENT — PAIN DESCRIPTION - PAIN TYPE: TYPE: ACUTE PAIN

## 2024-11-22 ASSESSMENT — ENCOUNTER SYMPTOMS
NAUSEA: 1
DIZZINESS: 1
HEADACHES: 1

## 2024-11-22 ASSESSMENT — GAIT ASSESSMENTS
DISTANCE (FEET): 250
DEVIATION: INCREASED BASE OF SUPPORT;BRADYKINETIC
ASSISTIVE DEVICE: FRONT WHEEL WALKER
GAIT LEVEL OF ASSIST: STANDBY ASSIST

## 2024-11-22 NOTE — PROGRESS NOTES
Assumed care of patient, bedside report completed. Pt and daughter sleeping at bedside, call light in reach, bed alarm in place & in low/locked position, VSS. SR on monitor, in 80-90s.

## 2024-11-22 NOTE — CARE PLAN
The patient is Stable - Low risk of patient condition declining or worsening    Shift Goals  Clinical Goals: MRI results, PT  Patient Goals: receive results  Family Goals: updates    Progress made toward(s) clinical / shift goals:    Problem: Knowledge Deficit - Standard  Goal: Patient and family/care givers will demonstrate understanding of plan of care, disease process/condition, diagnostic tests and medications  Outcome: Progressing     Problem: Fall Risk  Goal: Patient will remain free from falls  Outcome: Progressing       Patient is not progressing towards the following goals:

## 2024-11-22 NOTE — PROGRESS NOTES
2 RN Skin Assessment Completed by Tracey RN and Nikole RN.     Head: WDL  Ears: WDL  Nose: WDL  Mouth: WDL  Neck: WDL  Breasts/Chest: WDL  Shoulder Blades: WDL  Spine: WDL  (R) Arm/Elbow/hand: WDL  (L) Arm/Elbow/hand: WDL  Abdomen:WDL  Groin: WDL  Sacrum/Coccyx/Buttocks: blanching  (R) Leg: WDL  (L) Leg: WDL  (R) Heel/Foot/Toe: blanching  (L) Heel/Foot/Toe: blanching              Devices in place: BP Cuff and Pulse Ox     Interventions in place: Gray ear foams and Pillows     Possible skin injury found: No     Pictures uploaded into Epic: N/A  Wound Consult Placed: N/A

## 2024-11-22 NOTE — H&P
Hospital Medicine History & Physical Note    Date of Service  11/21/2024    Primary Care Physician  Shabana Swan M.D.    Consultants  None    Code Status  Full Code    Chief Complaint  No chief complaint on file.      History of Presenting Illness  Wilda Oviedo is a 69 y.o. female history of asthma, GERD who presented 11/21/2024 with dizziness/vertigo.    Patient was in her usual state of health when she was playing on an earring in her bathroom and then when walking away became very ataxic/dizzy/vertiginous.  Symptoms progressed to the point where she was unable to safely ambulate, caused nausea, also had headache.  Due to the symptoms she presented to OSH where CT head and basic labs were unremarkable, meclizine and Valium were tried with minimal improvement.  They are planning to admit for observation and MRI and patient requested transfer to [facility].    While here she is afebrile pulse 91-97 respiratory rate 16 systolic blood pressure 130s to 150s pulse ox 91 to 99% on 2 L nasal cannula.  Workup showed mild leukocytosis 12.8 CMP glucose 117 alk phos 93 otherwise unremarked UA unremarkable.  Patient with ongoing vertigo.  Denies any recent fevers or chills vision changes chest pain dyspnea cough vomiting abdominal pain dysuria diarrhea.    I discussed the plan of care with patient, family, bedside RN, charge RN, , and pharmacy.    Review of Systems  Review of Systems   Gastrointestinal:  Positive for nausea.   Neurological:  Positive for dizziness and headaches.       Past Medical History   has a past medical history of Asthma.    Surgical History   has a past surgical history that includes gyn surgery.     Family History  family history includes Breast Cancer in her mother; COPD in her mother; No Known Problems in her father.     Social History   reports that she quit smoking about 50 years ago. Her smoking use included cigarettes. She started smoking about 60 years ago. She has a 20  pack-year smoking history. She has never used smokeless tobacco. She reports that she does not currently use alcohol. She reports that she does not use drugs.    Allergies  No Known Allergies    Medications  Prior to Admission Medications   Prescriptions Last Dose Informant Patient Reported? Taking?   B Complex Vitamins (VITAMIN B COMPLEX PO) 11/20/2024 Morning  Yes Yes   Sig: Take 50 mg by mouth every day.   CALCIUM PO 11/21/2024 Morning  Yes Yes   Sig: Take 1,000 mg by mouth every day.   Cholecalciferol (VITAMIN D3) 5000 units Cap 11/21/2024 Morning  Yes Yes   Sig: Take 1 Cap by mouth every day.   Multiple Vitamin (DAILY VITAMINS PO) 11/21/2024  Yes Yes   Sig: Take  by mouth.   albuterol (PROVENTIL) 2.5mg/3ml Nebu Soln solution for nebulization 11/20/2024 Noon  No Yes   Sig: Take 3 mL by nebulization every four hours as needed for Shortness of Breath.   albuterol 108 (90 Base) MCG/ACT Aero Soln inhalation aerosol 11/20/2024 Noon  Yes Yes   Sig: Inhale 2 Puffs by mouth every 6 hours as needed for Shortness of Breath.   benzonatate (TESSALON) 100 MG Cap   No No   Sig: Take 1 Capsule by mouth 3 times a day as needed for Cough.   fluticasone furoate-vilanterol (BREO ELLIPTA) 200-25 MCG/ACT AEROSOL POWDER, BREATH ACTIVATED   Yes No   Sig: Inhale 1 Puff every day.   methylPREDNISolone (MEDROL DOSEPAK) 4 MG Tablet Therapy Pack   No No   Sig: Follow schedule on package instructions.   pantoprazole (PROTONIX) 40 MG Tablet Delayed Response 11/21/2024 Morning  No Yes   Sig: Take 1 Tablet by mouth every day.      Facility-Administered Medications: None       Physical Exam  Temp:  [36.6 °C (97.8 °F)-37.1 °C (98.7 °F)] 36.6 °C (97.8 °F)  Pulse:  [91-97] 97  Resp:  [16] 16  BP: (132-156)/(73-90) 132/73  SpO2:  [91 %-99 %] 97 %  Blood Pressure : 132/73   Temperature: 36.6 °C (97.8 °F)   Pulse: 97   Respiration: 16   Pulse Oximetry: 97 %       Physical Exam  Vitals and nursing note reviewed.   Constitutional:       Appearance:  Normal appearance. She is not ill-appearing or toxic-appearing.   HENT:      Head: Normocephalic and atraumatic.      Nose: Nose normal. No rhinorrhea.      Mouth/Throat:      Mouth: Mucous membranes are moist.      Pharynx: Oropharynx is clear.   Eyes:      General: No scleral icterus.     Extraocular Movements: Extraocular movements intact.      Conjunctiva/sclera: Conjunctivae normal.   Cardiovascular:      Rate and Rhythm: Normal rate and regular rhythm.      Pulses: Normal pulses.   Pulmonary:      Effort: Pulmonary effort is normal. No respiratory distress.      Breath sounds: Normal breath sounds. No wheezing, rhonchi or rales.   Abdominal:      General: There is no distension.      Palpations: Abdomen is soft.      Tenderness: There is no abdominal tenderness. There is no guarding or rebound.   Musculoskeletal:         General: No tenderness or deformity. Normal range of motion.      Cervical back: Normal range of motion and neck supple.   Skin:     General: Skin is warm and dry.      Capillary Refill: Capillary refill takes less than 2 seconds.   Neurological:      General: No focal deficit present.      Mental Status: She is alert and oriented to person, place, and time. Mental status is at baseline.      Cranial Nerves: No cranial nerve deficit.      Sensory: No sensory deficit.      Motor: No weakness.      Coordination: Coordination normal.      Comments: dizzy   Psychiatric:         Mood and Affect: Mood normal.         Behavior: Behavior normal.         Thought Content: Thought content normal.         Judgment: Judgment normal.         Laboratory:  Recent Labs     11/21/24 2008   WBC 12.8*   RBC 4.75   HEMOGLOBIN 14.0   HEMATOCRIT 41.5   MCV 87.4   MCH 29.5   MCHC 33.7   RDW 49.8   PLATELETCT 335   MPV 8.9*     Recent Labs     11/21/24 2008   SODIUM 136   POTASSIUM 3.8   CHLORIDE 99   CO2 25   GLUCOSE 117*   BUN 17   CREATININE 0.56   CALCIUM 8.8     Recent Labs     11/21/24 2008   ALTSGPT 15  "  ASTSGOT 21   ALKPHOSPHAT 103*   TBILIRUBIN 0.3   GLUCOSE 117*         No results for input(s): \"NTPROBNP\" in the last 72 hours.      No results for input(s): \"TROPONINT\" in the last 72 hours.    Imaging:  MR-BRAIN-W/O    (Results Pending)       no X-Ray or EKG requiring interpretation    Assessment/Plan:  Justification for Admission Status  I anticipate this patient is appropriate for observation status at this time because dizziness r/o CVA      * Vertigo- (present on admission)  Assessment & Plan  MRI ordered to rule out posterior stroke.  PT ordered.  Fall precautions.  Meclizine and Valium ordered.  Patient requesting ENT referral on discharge    ELBRON (obstructive sleep apnea)  Assessment & Plan  Patient requesting referral to sleep medicine on discharge.    Leukocytosis  Assessment & Plan  Mild, I suspect reactive. Watch for symptoms/fever        VTE prophylaxis: SCDs/TEDs and enoxaparin ppx  Total time spent on admission 77 minutes.    This included my review with ER physician, review of ED events, patient's history, outside records, recent records, face to face interview, physical examination; my review of lab results (CBC, chemistry panel), imaging review (CXR) and ECG. Also includes counseling patient on admission, treatment plan, and documentation of encounter.    "

## 2024-11-22 NOTE — CARE PLAN
The patient is Stable - Low risk of patient condition declining or worsening    Shift Goals  Clinical Goals: MRI Brain, Nausea Control, Tolerate diet  Patient Goals: Testing, GO home  Family Goals: Testing, Updates    Progress made toward(s) clinical / shift goals:    Problem: Knowledge Deficit - Standard  Goal: Patient and family/care givers will demonstrate understanding of plan of care, disease process/condition, diagnostic tests and medications  Description: Target End Date:  1-3 days or as soon as patient condition allows    Document in Patient Education    1.  Patient and family/caregiver oriented to unit, equipment, visitation policy and means for communicating concern  2.  Complete/review Learning Assessment  3.  Assess knowledge level of disease process/condition, treatment plan, diagnostic tests and medications  4.  Explain disease process/condition, treatment plan, diagnostic tests and medications  Outcome: Progressing     Problem: Fall Risk  Goal: Patient will remain free from falls  Description: Target End Date:  Prior to discharge or change in level of care    Document interventions on the Christa Nicole Fall Risk Assessment    1.  Assess for fall risk factors  2.  Implement fall precautions  Outcome: Progressing

## 2024-11-22 NOTE — ASSESSMENT & PLAN NOTE
MRI ordered to rule out posterior stroke.  PT ordered.  Fall precautions.  Meclizine and Valium ordered.  Patient requesting ENT referral on discharge

## 2024-11-22 NOTE — PROGRESS NOTES
Patient arrived to CDU with all personal belongings. Monitors notified for tele, and family is at bedside.

## 2024-11-22 NOTE — ASSESSMENT & PLAN NOTE
Emergency Department    [address] [place] [id number]    Phone:  [phone]    Fax:  [phone]                                       Pardeep Whaley   MRN: [id number]    Department:   Emergency Department   Date of Visit:  5/9/2017           After Visit Summary Signature Page     I have received my discharge instructions, and my questions have been answered. I have discussed any challenges I see with this plan with the nurse or doctor.    ..........................................................................................................................................  Patient/Patient Representative Signature      ..........................................................................................................................................  Patient Representative Print Name and Relationship to Patient    ..................................................               ................................................  Date                                            Time    ..........................................................................................................................................  Reviewed by Signature/Title    ...................................................              ..............................................  Date                                                            Time           Mild, I suspect reactive. Watch for symptoms/fever

## 2024-11-22 NOTE — THERAPY
Physical Therapy   Initial Evaluation     Patient Name: Wilda Oviedo  Age:  69 y.o., Sex:  female  Medical Record #: [id number]  Today's Date: 11/22/2024     Precautions  Precautions: Fall Risk    Assessment  Pt presents with impaired activity tolerance associated with chief complaint dizziness/vertigo onset described as just washing/rinsed her left ear and looking in mirror out left vision and got very vertiginous in setting of prior right knee pain, adult onset asthma. Pt reports sudden onset and continues to have a dizzy sensation, subjective hx points away from BPPV cause (length of during, activity during onset and continuing currently) however cleared vertebral artery and tested bilateral posterior canals, no nystagmus and could not produce symptoms; does endorse ongoing health issues with asthma and PNA with intermittent antibiotics and prednisone use however she also endorses losing 6-7 pounds recently; does report only two meals a day, discussed preliminary results of MRI with microvascular chronic ischemic and how hydration with protein/water can assist with reduction of symptoms; functionally, pt has enough support to dc home when medically appropriate to do so. Would benefit from outpatient vestibular and/or ENT follow up and if negative outcome there would benefit from endocrinologist given age/weight. Will follow if remains in house.         Plan    Physical Therapy Initial Treatment Plan   Treatment Plan : Equipment, Bed Mobility, Gait Training, Manual Therapy, Neuro Re-Education / Balance, Self Care / Home Evaluation, Therapeutic Activities, Stair Training, Therapeutic Exercise  Treatment Frequency: 2 Times per Week  Duration: Until Therapy Goals Met    DC Equipment Recommendations: Front-Wheel Walker  Discharge Recommendations: Recommend outpatient physical therapy services to address higher level deficits       Abridged Subjective/Objective     11/22/24 1235   Prior Living Situation   Prior  Services Home-Independent   Housing / Facility 1 Story House   Steps Into Home 1   Equipment Owned None   Lives with - Patient's Self Care Capacity Spouse   Comments spouse can provide 24/7 support; pt also has 6 people in room (one dtr NP in room as well); denies falls or other restrictions usually is very active but has had about 3 bouts of 'generally not feeling well' which appears treated for PNA x 3 with antibiotics and prednisone; also endorsing recent weight loss 6-7 pounds over the last two months;   Prior Level of Functional Mobility   Bed Mobility Independent   Transfer Status Independent   Ambulation Independent   Ambulation Distance to tolerance   Assistive Devices Used None   Stairs Independent   Cognition    Cognition / Consciousness WDL   Level of Consciousness Alert   Comments pleasant and cooperative; several family members in room   Passive ROM Lower Body   Passive ROM Lower Body WDL   Strength Lower Body   Lower Body Strength  WDL   Sensation Lower Body   Lower Extremity Sensation   WDL   Comments denies t/n   Balance Assessment   Sitting Balance (Static) Good   Sitting Balance (Dynamic) Fair +   Standing Balance (Static) Fair +   Standing Balance (Dynamic) Fair   Weight Shift Sitting Good   Weight Shift Standing Good   Comments B UE support in sitting/standing; lateral instability/wants hands on FWW/reaching for wall   Bed Mobility    Supine to Sit   (NT, up in hallway with CNA)   Gait Analysis   Gait Level Of Assist Standby Assist   Assistive Device Front Wheel Walker   Distance (Feet) 250   # of Times Distance was Traveled 1   Deviation Increased Base Of Support;Bradykinetic   Weight Bearing Status full   Vision Deficits Impacting Mobility denies   Comments reports constant dizziness and waviness but room not spinning; denies distortion of objects   Functional Mobility   Sit to Stand Standby Assist   Bed, Chair, Wheelchair Transfer Standby Assist   Short Term Goals    Short Term Goal # 1 Pt  will ambulate x 150ft with no equipment and supervision within 6 visits to ensure independent mobility at home.   Education Group   Role of Physical Therapist Patient Response Patient;Acceptance;Explanation;Demonstration;Verbal Demonstration;Action Demonstration   Additional Comments protein/water; hydration of brain; aerobic exercise, URI symptoms; follow up outpatient vestibular PT vs ENT as well as potential for endocrinologist

## 2024-11-22 NOTE — PROGRESS NOTES
[facility] DIRECT ADMISSION REPORT  Transferring facility: [facility]  Transferring physician: Dr. Chavez    Chief complaint: Intractable dizziness  Pertinent history & patient course:     Wilda Oviedo is a 69 y.o. female with history of asthma who presented on with intractable dizziness described as room is spinning.  Also having some nausea and vomiting.  No other neurologic deficits.  CT head and basic labs are unremarkable.  ER physician had tried meclizine and Valium which helped for about 10 minutes after which she again had severe symptoms.  Patient specifically requested [facility] rather than [facility]    If symptoms persist could consider MRI brain    Pertinent imaging & lab results: As above  Consultants called prior to transfer and pertinent input from consultants: None  Code Status: Full per transferring provider, I personally verified with the transferring provider patient's code status and the transferring provider has confirmed this with the patient.  Reason for Transfer: Intractable dizziness  Further work up or recommendations requested prior to transfer: None    Patient accepted for transfer: Yes  Accepting [facility] Facility: [facility] to notify the Triage Coordinator in the RTOC via Voalte or Phone ext. [phone] when patient arrives to the unit. The Triage Coordinator will assign the admitting provider.    Consultants to be called upon arrival: None  Admission status: Observation.   Floor requested: CDU  If ICU transfer, name of intensivist case discussed with and pertinent input from critical care: N/A    The admitting provider is the point of contact for questions or concerns regarding patient's care.

## 2024-11-23 NOTE — DISCHARGE SUMMARY
Discharge Summary    CHIEF COMPLAINT ON ADMISSION  No chief complaint on file.      Reason for Admission  Vertigo     Admission Date  11/21/2024    CODE STATUS  Full Code    HPI & HOSPITAL COURSE  Wilda Oviedo is a 69 y.o. female with history of asthma, GERD, admitted 11/21/2024 with acute onset dizziness/vertigo which has progressed to the point that she was unable to safely ambulate.  She had nausea and also some headaches.  She initially presented to an outside facility where head CT was negative and basic labs were unremarkable.  On evaluation here, she had mild leukocytosis with WBC of 12,800.  Electrolytes and renal function were normal.  Transaminases and bilirubin were normal.  Further vertigo workup was pursued.  She had an MRI done which did not show anything acute.  She was placed on as needed meclizine along with as needed antiemetics.  She was also seen by physical therapy for vestibular therapy.  Her vertigo improved.    She did require 2 L of oxygen by nasal cannula initially.  D-dimer was obtained which was low.  CT of the chest and chest x-ray only showed basilar atelectasis.  She was placed on incentive spirometry, and her hypoxemia improved.  She was able to be weaned off supplemental oxygen.    I have personally seen and examined the patient on the day of discharge. With her clinical improvement, she was deemed ready to discharge from the hospital as she did not have any further hospitalization needs. Patient felt comfortable going home. The discharge plan was discussed with the patient, with which she was agreeable to.     Therefore, she is discharged in good and stable condition to home with close outpatient follow-up.      Discharge Date  11/22/2024      FOLLOW UP ITEMS POST DISCHARGE  -She is prescribed as needed meclizine along with Reglan/Benadryl.  -She is referred to outpatient physical therapy for vestibular therapy.  - she is also referred to ENT.  -She is referred to outpatient  sleep medicine for evaluation for LEBRON per her request.  -Follow-up with PCP.  - counseled to seek immediate medical attention, or return to the ED for recurrent or worsening symptoms.      DISCHARGE DIAGNOSES  Principal Problem:    Vertigo (POA: Yes)  Active Problems:    Leukocytosis (POA: Unknown)    LEBRON (obstructive sleep apnea) (POA: Unknown)    Obesity (POA: Yes)  Resolved Problems:    * No resolved hospital problems. *      FOLLOW UP  Future Appointments   Date Time Provider Department Center   11/22/2024  5:30 PM [facility] CT 3 [facility]     No follow-up provider specified.    MEDICATIONS ON DISCHARGE     Medication List        START taking these medications        Instructions   diphenhydrAMINE 25 MG capsule  Commonly known as: Benadryl   Take 1 Capsule by mouth 4 times a day as needed (nausea, vomiting (take with reglan)).  Dose: 25 mg     meclizine 25 MG Tabs  Commonly known as: Antivert   Take 1 Tablet by mouth 3 times a day as needed for Vertigo or Dizziness.  Dose: 25 mg     metoclopramide 10 MG Tabs  Commonly known as: Reglan   Take 1 Tablet by mouth 4 times a day as needed (nausea, vomiting).  Dose: 10 mg            CONTINUE taking these medications        Instructions   * albuterol 108 (90 Base) MCG/ACT Aers inhalation aerosol   Inhale 2 Puffs by mouth every 6 hours as needed for Shortness of Breath.  Dose: 2 Puff     * albuterol 2.5mg/3ml Nebu solution for nebulization  Commonly known as: Proventil   Take 3 mL by nebulization every four hours as needed for Shortness of Breath.  Dose: 2.5 mg     benzonatate 100 MG Caps  Commonly known as: Tessalon   Take 1 Capsule by mouth 3 times a day as needed for Cough.  Dose: 100 mg     Breo Ellipta 200-25 MCG/ACT Aepb  Generic drug: fluticasone furoate-vilanterol   Inhale 1 Puff every day.  Dose: 1 Puff     CALCIUM PO   Take 1,000 mg by mouth every day.  Dose: 1,000 mg     DAILY VITAMINS PO   Take  by mouth.     methylPREDNISolone 4 MG Tbpk  Commonly  known as: Medrol Dosepak   Follow schedule on package instructions.     pantoprazole 40 MG Tbec  Commonly known as: Protonix   Take 1 Tablet by mouth every day.  Dose: 40 mg     VITAMIN B COMPLEX PO   Take 50 mg by mouth every day.  Dose: 50 mg     vitamin D3 125 MCG (5000 UT) Caps   Take 1 Cap by mouth every day.  Dose: 1 Capsule           * This list has 2 medication(s) that are the same as other medications prescribed for you. Read the directions carefully, and ask your doctor or other care provider to review them with you.                  Allergies  No Known Allergies    DIET  Orders Placed This Encounter   Procedures    Diet Order Diet: Regular     Standing Status:   Standing     Number of Occurrences:   1     Order Specific Question:   Diet:     Answer:   Regular [1]       ACTIVITY  As tolerated.  Weight bearing as tolerated    CONSULTATIONS  None    PROCEDURES  None    LABORATORY  Lab Results   Component Value Date    SODIUM 136 11/21/2024    POTASSIUM 3.8 11/21/2024    CHLORIDE 99 11/21/2024    CO2 25 11/21/2024    GLUCOSE 117 (H) 11/21/2024    BUN 17 11/21/2024    CREATININE 0.56 11/21/2024        Lab Results   Component Value Date    WBC 11.4 (H) 11/22/2024    HEMOGLOBIN 13.6 11/22/2024    HEMATOCRIT 41.0 11/22/2024    PLATELETCT 333 11/22/2024        Total time of the discharge process = 42 minutes.

## 2024-11-23 NOTE — PROGRESS NOTES
Wilda Oviedo has been provided discharge instructions, to include follow up care, home medications, and activity/diet reviewed. Copy of discharge instructions in patient chart, signed and reviewed. Patient verbalizes the understanding of the discharge instructions. Arm band removed. Patient did not have any home meds during admit. Meds to Beds given. Questions and concerns addressed prior to leaving the discharge lounge. Transported via car by family. Patient discharge to home.

## 2024-11-23 NOTE — DISCHARGE INSTRUCTIONS
FOLLOW UP ITEMS POST DISCHARGE  -She is prescribed as needed meclizine along with Reglan/Benadryl.  -She is referred to outpatient physical therapy for vestibular therapy.  - she is also referred to ENT.  -She is referred to outpatient sleep medicine for evaluation for LEBRON per her request.  -Follow-up with PCP.  - counseled to seek immediate medical attention, or return to the ED for recurrent or worsening symptoms.

## 2025-01-22 ENCOUNTER — APPOINTMENT (OUTPATIENT)
Dept: PHYSICAL THERAPY | Facility: REHABILITATION | Age: 70
End: 2025-01-22
Attending: INTERNAL MEDICINE
Payer: MEDICARE

## 2025-01-28 ENCOUNTER — APPOINTMENT (OUTPATIENT)
Dept: PHYSICAL THERAPY | Facility: REHABILITATION | Age: 70
End: 2025-01-28
Attending: INTERNAL MEDICINE
Payer: MEDICARE

## 2025-02-03 ENCOUNTER — OFFICE VISIT (OUTPATIENT)
Dept: SLEEP MEDICINE | Facility: MEDICAL CENTER | Age: 70
End: 2025-02-03
Attending: INTERNAL MEDICINE
Payer: MEDICARE

## 2025-02-03 VITALS
HEIGHT: 63 IN | WEIGHT: 163 LBS | HEART RATE: 84 BPM | DIASTOLIC BLOOD PRESSURE: 80 MMHG | RESPIRATION RATE: 12 BRPM | OXYGEN SATURATION: 95 % | BODY MASS INDEX: 28.88 KG/M2 | SYSTOLIC BLOOD PRESSURE: 120 MMHG

## 2025-02-03 DIAGNOSIS — G47.33 OSA (OBSTRUCTIVE SLEEP APNEA): ICD-10-CM

## 2025-02-03 DIAGNOSIS — F51.01 PRIMARY INSOMNIA: ICD-10-CM

## 2025-02-03 PROCEDURE — 99212 OFFICE O/P EST SF 10 MIN: CPT | Performed by: STUDENT IN AN ORGANIZED HEALTH CARE EDUCATION/TRAINING PROGRAM

## 2025-02-03 PROCEDURE — 99204 OFFICE O/P NEW MOD 45 MIN: CPT | Performed by: STUDENT IN AN ORGANIZED HEALTH CARE EDUCATION/TRAINING PROGRAM

## 2025-02-03 RX ORDER — ZOLPIDEM TARTRATE 5 MG/1
5 TABLET ORAL NIGHTLY PRN
Qty: 3 TABLET | Refills: 0 | Status: SHIPPED | OUTPATIENT
Start: 2025-02-03 | End: 2025-03-03

## 2025-02-03 ASSESSMENT — FIBROSIS 4 INDEX: FIB4 SCORE: 1.12

## 2025-02-03 NOTE — PROGRESS NOTES
"     [facility] Consult Note     Date: 2/3/2025 / Time: 11:09 AM      Thank you for requesting a sleep medicine consultation on Wilda Oviedo at the sleep center. Presents today with the   Chief Complaint   Patient presents with    New Patient     Ref by Dr. Wade Dx: LBERON    Is Pt currently on PAP/O2? NO    Any prior Sleep Studies? NO.    Previously seen with [facility]? NO       She is referred by Russ Wade M.D.  [address] [place] [phone] for evaluation and treatment of sleep disorder breathing .     HISTORY OF PRESENT ILLNESS:     Wilda Oviedo is a 69 y.o. female with hypothryoidism, asthma on breo, chronic knee pain, GERD, LEBRON who Presents to Sleep Clinic for LEBRON management.     Referred after hospitalization (for vertigo) noted to have desaturations during the night, concerned for LEBRON    She has been told that she snores all her life    As per supplemental questionnaire to be scanned or imported into chart:    [place] Sleepiness Score: 0    Sleep Schedule  Bedtime: 9:30 pm   Wake time: 5:30 a  Sleep-onset latency: 10 min  Awakenings from sleep: 2-3x often to urinate  Difficulty falling back asleep: no  Bedroom partner: yes,   Naps: No     DAYTIME SYMPTOMS:   Excessive daytime sleepiness: No   Daytime fatigue: Yes  Difficulty concentrating: No   Memory problems: No   Irritability:No   Work/school performance issues: No , retired. Was an  for the school district   Sleepiness with driving: No   Caffeine/stimulant use: Yes - 2-3 cups of coffee  Alcohol use:No     SLEEP RELATED SYMPTOMS  Snoring: Yes  Witnessed apnea or gasping/choking: Yes  Dry mouth or mouth breathing: Yes  Sweating: No   Teeth grinding/biting: No   Morning headaches: Yes  Refreshed Upon Awakening: Yes     SLEEP RELATED BEHAVIORS:  Parasomnias (walking, talking, eating, violence): No   Leg kicking: No   Restless legs - \"urge to move\": No   Nightmares: No  Recurrent: No   Dream " enactment: No      NARCOLEPSY:  Cataplexy: No   Sleep paralysis: No   Sleep attacks: No   Hypnagogic/hypnopompic hallucinations: No     MEDICAL HISTORY  Past Medical History:   Diagnosis Date    Asthma         SURGICAL HISTORY  Past Surgical History:   Procedure Laterality Date    GYN SURGERY      tubes tied         FAMILY HISTORY  Family History   Problem Relation Age of Onset    COPD Mother     Breast Cancer Mother     No Known Problems Father     Stroke Neg Hx     Heart Disease Neg Hx        SOCIAL HISTORY  Social History     Socioeconomic History    Marital status:    Tobacco Use    Smoking status: Former     Current packs/day: 0.00     Average packs/day: 2.0 packs/day for 10.0 years (20.0 ttl pk-yrs)     Types: Cigarettes     Start date:      Quit date:      Years since quittin.1    Smokeless tobacco: Never   Vaping Use    Vaping status: Never Used   Substance and Sexual Activity    Alcohol use: Not Currently    Drug use: No     Social Drivers of Health     Food Insecurity: Patient Declined (2024)    Hunger Vital Sign     Worried About Running Out of Food in the Last Year: Patient declined     Ran Out of Food in the Last Year: Patient declined   Transportation Needs: Patient Declined (2024)    PRAPARE - Transportation     Lack of Transportation (Medical): Patient declined     Lack of Transportation (Non-Medical): Patient declined   Intimate Partner Violence: Patient Declined (2024)    Humiliation, Afraid, Rape, and Kick questionnaire     Fear of Current or Ex-Partner: Patient declined     Emotionally Abused: Patient declined     Physically Abused: Patient declined     Sexually Abused: Patient declined   Housing Stability: Patient Declined (2024)    Housing Stability Vital Sign     Unable to Pay for Housing in the Last Year: Patient declined     Number of Times Moved in the Last Year: 1     Homeless in the Last Year: Patient declined          CURRENT  MEDICATIONS  Current Outpatient Medications   Medication Sig    fluticasone furoate-vilanterol (BREO ELLIPTA) 200-25 MCG/ACT AEROSOL POWDER, BREATH ACTIVATED Inhale 1 Puff every day.    meclizine (ANTIVERT) 25 MG Tab Take 1 Tablet by mouth 3 times a day as needed for Vertigo or Dizziness. (Patient not taking: Reported on 2/3/2025)    metoclopramide (REGLAN) 10 MG Tab Take 1 Tablet by mouth 4 times a day as needed (nausea, vomiting). (Patient not taking: Reported on 2/3/2025)    diphenhydrAMINE (BENADRYL) 25 MG capsule Take 1 Capsule by mouth 4 times a day as needed (nausea, vomiting (take with reglan)). (Patient not taking: Reported on 2/3/2025)    benzonatate (TESSALON) 100 MG Cap Take 1 Capsule by mouth 3 times a day as needed for Cough. (Patient not taking: Reported on 2/3/2025)    methylPREDNISolone (MEDROL DOSEPAK) 4 MG Tablet Therapy Pack Follow schedule on package instructions. (Patient not taking: Reported on 2/3/2025)    albuterol (PROVENTIL) 2.5mg/3ml Nebu Soln solution for nebulization Take 3 mL by nebulization every four hours as needed for Shortness of Breath. (Patient not taking: Reported on 2/3/2025)    pantoprazole (PROTONIX) 40 MG Tablet Delayed Response Take 1 Tablet by mouth every day. (Patient not taking: Reported on 2/3/2025)    albuterol 108 (90 Base) MCG/ACT Aero Soln inhalation aerosol Inhale 2 Puffs by mouth every 6 hours as needed for Shortness of Breath. (Patient not taking: Reported on 2/3/2025)    CALCIUM PO Take 1,000 mg by mouth every day. (Patient not taking: Reported on 2/3/2025)    B Complex Vitamins (VITAMIN B COMPLEX PO) Take 50 mg by mouth every day. (Patient not taking: Reported on 2/3/2025)    Cholecalciferol (VITAMIN D3) 5000 units Cap Take 1 Cap by mouth every day. (Patient not taking: Reported on 2/3/2025)    Multiple Vitamin (DAILY VITAMINS PO) Take  by mouth. (Patient not taking: Reported on 2/3/2025)       REVIEW OF SYSTEMS  Constitutional: Denies fevers, Denies weight  "changes  Ears/Nose/Throat/Mouth: Denies nasal congestion or sore throat   Cardiovascular: Denies chest pain  Respiratory: Denies shortness of breath, Denies cough  Gastrointestinal/Hepatic: Denies nausea, vomiting  Sleep: see HPI    Physical Examination:  Vitals/ General Appearance:   Weight/BMI: Body mass index is 28.87 kg/m².  Vitals:    02/03/25 1245   BP: 120/80   BP Location: Left arm   Patient Position: Sitting   BP Cuff Size: Adult   Pulse: 84   Resp: 12   SpO2: 95%   Weight: 73.9 kg (163 lb)   Height: 1.6 m (5' 3\")       Pt. is alert and oriented to time, place and person. Cooperative and in no apparent distress.     Constitutional: Alert, no distress, well-groomed.  Skin: No rashes in visible areas.  Eye: Round. Conjunctiva clear, lids normal. No icterus.   ENT EXAM  Nasal alae/valves collapsible: Yes  Nasal septum deviation: No   Nasal turbinate hypertrophy: Left: Grade 1   Right: Grade 1  Hard palate narrow: Yes  Hard palate high: Yes  Soft palate/uvula (Mallampati score): 4  Tongue Scalloping: Yes  Retrognathia: Yes  Micrognathia: No   Neurologic:Awake, alert and oriented x 3  Extremities: No clubbing, cyanosis, or edema     Bicarb:   Lab Results   Component Value Date/Time    CO2 25 11/21/2024 2008    CO2 25 04/28/2023 1321    CO2 26 03/09/2022 1016     TSH:   Lab Results   Component Value Date/Time    TSHULTRASEN 3.900 04/28/2023 1321     CREATININE:   Lab Results   Component Value Date/Time    CREATININE 0.56 11/21/2024 2008     VIT D:   Lab Results   Component Value Date/Time    25HYDROXY 55 04/28/2023 1321     H/H:  Lab Results   Component Value Date/Time    HEMOGLOBIN 13.6 11/22/2024 07:34 AM       ASSESSMENT AND PLAN   1. Wilda Oviedo  has symptoms of Obstructive Sleep Apnea (LEBRON) including snoring, witnessed apnea, dry mouth, morning headaches, fragmented sleep with nocturia. These sxs interfere with activities of daily living.   Pt has risk factors for LEBRON include overweight, and crowded " oropharynx.     The pathophysiology of LEBRON and the increased risk of cardiovascular morbidity from untreated LEBRON is discussed in detail with the patient.      We have discussed diagnostic options including in-laboratory, attended polysomnography and home sleep testing. We have also discussed treatment options including airway pressurization, reconstructive otolaryngologic surgery, dental appliances and weight management.     Subsequently,treatment options will be discussed based on the diagnostic study. Meanwhile, She is urged to avoid supine sleep, weight gain and alcoholic beverages since all of these can worsen LEBRON. She is cautioned against drowsy driving. If She feels sleepy while driving, advised must pull over for a break/nap, rather than persist on the road, in the interest of Pt's own safety and that of others on the road.    Plan  -  She  will be scheduled for an overnight PSG to assess sleep related breathing disorder.  - Pt is amenable to CPAP if clinically indicated  - Follow up 1-2 weeks after sleep study to discuss results and treatment options moving forward   -Advised to reach out via MyChart or by phone with any questions or concerns.     2.  Regarding treatment of other past medical problems and general health maintenance,  Pt is urged to follow up with PCP.      Please note portions of this record was created using voice recognition software. I have made every reasonable attempt to correct obvious errors, but I expect that there are errors of grammar and possibly content I did not discover before finalizing the note.

## 2025-02-04 ENCOUNTER — APPOINTMENT (OUTPATIENT)
Dept: PHYSICAL THERAPY | Facility: REHABILITATION | Age: 70
End: 2025-02-04
Attending: INTERNAL MEDICINE
Payer: MEDICARE

## 2025-02-11 ENCOUNTER — APPOINTMENT (OUTPATIENT)
Dept: PHYSICAL THERAPY | Facility: REHABILITATION | Age: 70
End: 2025-02-11
Attending: INTERNAL MEDICINE
Payer: MEDICARE

## 2025-02-19 ENCOUNTER — APPOINTMENT (OUTPATIENT)
Dept: PHYSICAL THERAPY | Facility: REHABILITATION | Age: 70
End: 2025-02-19
Attending: INTERNAL MEDICINE
Payer: MEDICARE

## 2025-02-21 ENCOUNTER — HOSPITAL ENCOUNTER (OUTPATIENT)
Dept: RADIOLOGY | Facility: MEDICAL CENTER | Age: 70
End: 2025-02-21
Attending: FAMILY MEDICINE
Payer: MEDICARE

## 2025-02-21 DIAGNOSIS — Z12.31 VISIT FOR SCREENING MAMMOGRAM: ICD-10-CM

## 2025-02-21 PROCEDURE — 77067 SCR MAMMO BI INCL CAD: CPT

## 2025-03-05 ENCOUNTER — APPOINTMENT (OUTPATIENT)
Dept: PHYSICAL THERAPY | Facility: REHABILITATION | Age: 70
End: 2025-03-05
Attending: INTERNAL MEDICINE
Payer: MEDICARE

## 2025-03-06 ENCOUNTER — TELEPHONE (OUTPATIENT)
Dept: SLEEP MEDICINE | Facility: MEDICAL CENTER | Age: 70
End: 2025-03-06
Payer: MEDICARE

## 2025-03-06 DIAGNOSIS — F51.01 PRIMARY INSOMNIA: ICD-10-CM

## 2025-03-06 DIAGNOSIS — G47.33 OSA (OBSTRUCTIVE SLEEP APNEA): ICD-10-CM

## 2025-03-06 NOTE — TELEPHONE ENCOUNTER
Patient called and was concerned that the Ambien RX for her upcoming overnight sleep study on 3/21/25, has not yet reached [facility] pharmacy [facility]. Patient says she was last seen in our office on 2/3/25, and thought it was strange that pharmacy still has not received the RX. Patient would like a phone call back informing her when it has been completed.

## 2025-03-07 NOTE — TELEPHONE ENCOUNTER
zolpidem (AMBIEN) 5 MG Tab 3 Tablet 0/0 2/3/2025 3/3/2025    Sig - Route: Take 1 Tablet by mouth at bedtime as needed for Sleep (1 to 3 po qhs prn insomnia/sleep study. Bring to sleep study.) for up to 3 doses. - Oral    Class: Print Rx [facility]        Resend Rx to be sent electronically to pharmacy, looks like it was to be printed. Thank you

## 2025-03-10 ENCOUNTER — APPOINTMENT (OUTPATIENT)
Dept: PHYSICAL THERAPY | Facility: REHABILITATION | Age: 70
End: 2025-03-10
Attending: INTERNAL MEDICINE
Payer: MEDICARE

## 2025-03-10 NOTE — TELEPHONE ENCOUNTER
Can you send RX for Ambien to   [facility]/pharmacy #[id number] - [place], [place] - [address]  [address]  [place] [address]  Phone: [phone] Fax: [phone]    For SS scheduled on 03/21/2025

## 2025-03-13 ENCOUNTER — APPOINTMENT (OUTPATIENT)
Dept: PHYSICAL THERAPY | Facility: REHABILITATION | Age: 70
End: 2025-03-13
Attending: INTERNAL MEDICINE
Payer: MEDICARE

## 2025-03-13 RX ORDER — ZOLPIDEM TARTRATE 5 MG/1
5 TABLET ORAL NIGHTLY PRN
Qty: 1 TABLET | Refills: 0 | Status: SHIPPED | OUTPATIENT
Start: 2025-03-13 | End: 2025-03-14

## 2025-03-17 ENCOUNTER — APPOINTMENT (OUTPATIENT)
Dept: PHYSICAL THERAPY | Facility: REHABILITATION | Age: 70
End: 2025-03-17
Attending: INTERNAL MEDICINE
Payer: MEDICARE

## 2025-03-18 ENCOUNTER — APPOINTMENT (OUTPATIENT)
Dept: RADIOLOGY | Facility: MEDICAL CENTER | Age: 70
End: 2025-03-18
Attending: FAMILY MEDICINE
Payer: MEDICARE

## 2025-03-19 ENCOUNTER — APPOINTMENT (OUTPATIENT)
Dept: PHYSICAL THERAPY | Facility: REHABILITATION | Age: 70
End: 2025-03-19
Attending: INTERNAL MEDICINE
Payer: MEDICARE

## 2025-03-19 ENCOUNTER — PATIENT MESSAGE (OUTPATIENT)
Dept: SLEEP MEDICINE | Facility: MEDICAL CENTER | Age: 70
End: 2025-03-19
Payer: MEDICARE

## 2025-03-21 ENCOUNTER — APPOINTMENT (OUTPATIENT)
Dept: SLEEP MEDICINE | Facility: MEDICAL CENTER | Age: 70
End: 2025-03-21
Attending: STUDENT IN AN ORGANIZED HEALTH CARE EDUCATION/TRAINING PROGRAM
Payer: MEDICARE

## 2025-03-21 DIAGNOSIS — G47.33 OSA (OBSTRUCTIVE SLEEP APNEA): ICD-10-CM

## 2025-03-21 PROCEDURE — 95811 POLYSOM 6/>YRS CPAP 4/> PARM: CPT | Performed by: STUDENT IN AN ORGANIZED HEALTH CARE EDUCATION/TRAINING PROGRAM

## 2025-03-24 ENCOUNTER — APPOINTMENT (OUTPATIENT)
Dept: RADIOLOGY | Facility: MEDICAL CENTER | Age: 70
End: 2025-03-24
Attending: FAMILY MEDICINE
Payer: MEDICARE

## 2025-03-24 NOTE — PROCEDURES
Physician:   Patient: NICHOL HUNT  ID: [id number] Date: 3/21/2025 Exam No.:   MONTAGE: Standard  STUDY TYPE: Split Night  RECORDING TECHNIQUE:   After the scalp was prepared, gold plated electrodes were applied to the scalp according to the International 10-20 System. EEG (electroencephalogram) was continuously monitored from the O1-M2, O2-M1, C3-M2, C4-M1, F3-M2, and F4-M1. EOGs (electrooculograms) were monitored by electrodes placed at the left and right outer canthi. Chin EMG (electromyogram) was monitored by electrodes placed on the mentalis and sub-mentalis muscles. Nasal and oral airflow were monitored using a triple port thermocouple as well as oronasal pressure transducer. Respiratory effort was measured by inductive plethysmography technology employing abdominal and thoracic belts. Blood oxygen saturation and pulse were monitored by pulse oximetry. Heart rhythm was monitored by surface electrocardiogram. Leg EMG was studied using surface electrodes placed on left and right anterior tibialis. A microphone was used to monitor tracheal sounds and snoring. Body position was monitored and documented by technician observation.   SCORING CRITERIA:   A modification of the AASM manual for scoring of sleep and associated events was used. Obstructive apneas were scored by cessation of airflow for at least 10 seconds with continuing respiratory effort. Central apneas were scored by cessation of airflow for at least 10 seconds with no respiratory effort. Hypopneas were scored by a 30% or more reduction in airflow for at least 10 seconds accompanied by arterial oxygen desaturation of 3% or an arousal. For CMS (Medicare) patients, per AASM rule 1B, hypopneas are scored by 30% with mild reduction in airflow for at least 10 seconds accompanied by arterial saturation decreased at 4%.  DIAGNOSTIC  Study start time was 09:37:12 PM. Diagnostic recording time was 190 minutes with a total sleep time of 138 minutes resulting in a  sleep efficiency of 72.63%%. Sleep latency from the start of the study was 18 minutes and the latency from sleep to REM was 146 minutes. In total,39 arousals were scored for an arousal index of 17.0.  Respiratory:  There were a total of 6 apneas consisting of 6 obstructive apneas, 0 mixed apneas, and 0 central apneas. A total of 45 hypopneas were scored. The apnea index was 2.61 per hour and the hypopnea index was 19.57 per hour resulting in an overall AHI of 22.17. AHI during REM was 96.0 and AHI while supine was 43.90.  Oximetry:  There was a mean oxygen saturation of 92.0%. The minimum oxygen saturation during NREM sleep was 77.0% and in REM was 73.0%. Time spent during sleep with oxygen saturations <88% was 6.5 minutes.   Cardiac:  The highest heart rate seen while awake was 107 BPM while the highest heart rate during sleep was 95 BPM with an average sleeping heart rate of 78 BPM.  Limb Movements:  There were a total of 8 PLMs during sleep, which resulted in a PLM index of 3.5. There were 17 PLMs associated with arousals which resulted in a PLMS arousal index of 7.4.  TREATMENT:  Treatment recording time was 4h 7.0m (247 minutes) with a total sleep time of 3h 18.5m (198 minutes) resulting in a sleep efficiency of 80.4%. Sleep latency from the start of treatment was 01 minutes and REM latency from sleep onset was 1h 15.0m. The patient had 38 arousals in total for an arousal index of 11.5.  Respiratory:   There were 20 apneas in total consisting of 19 obstructive apneas, 1 central apneas, and 0 mixed apneas for an apnea index of 6.05. The patient had 14 hypopneas in total, which resulted in a hypopnea index of 4.23. The overall AHI was 10.28, with a REM AHI of 23.03, and a supine AHI of 18.79.   Oximetry:  The mean SaO2 during treatment was 93.0%. The minimum oxygen saturation in NREM was 85.0 % and in REM was 74.0%. Patient spent 4.9 minutes of TST with SaO2 <88%.  Cardiac:  The highest heart rate during sleep  was 94 BPM with an average sleeping heart rate of 75BPM.  Limb Movements:  There were a total of 0 PLMS during titration sleep time that resulted in an index of 0.0. There were 7 PLMS associated with arousals. This resulted in a PLM arousal index of 2.1.  Titration: CPAP was tried from 5-11cm H2O.  This was a fully attended sleep study. This test was technically adequate. This patient was titrated on CPAP starting at 5 cm of water pressure. Patient was titrated up to 11 cm of water pressure. Patient did best at 9 cm of water pressure. Patient spent 80 minutes at that pressure and the AHI was 12.67 which is considered mild obstructive sleep apnea.  She achieved supine REM on this pressure setting, suspect likely higher pressures to control obstructive apnea events.  Assessment:   DIAGNOSTIC: Moderate Obstructive Sleep Apnea Hypopnea - AHI 22.17 with Nocturnal desaturation - megan saturation 73% - saturations <88% below for 6.5 minutes of TST.  TREATMENT: Mild Obstructive Sleep Apnea Hypopnea - AHI 10.28 with Nocturnal desaturation - megan saturation 74% - saturations <88% below for 4.9 minutes of TST.  Impression:  Patient had fragmented sleep resulting in slightly reduced sleep efficiency during sleep study however this can be expected in lab environment  Notably patient's respiratory events are strongly positional and much worse in supine position  Very little REM seen during diagnostic portion with rebound REM seen on CPAP  Once patient met criteria for split-night based on severity of LEBRON, she was started on CPAP  Patient's respiratory events and oxygenation improved on higher CPAP settings, however difficult to control respiratory events during supine REM   Recommendation:   Would trial auto CPAP 9-15 cmH2O with heated tubing and proper mask fit.  Given respiratory events during supine REM were difficult to control suggest close follow-up and possibly OPO once patient settled on CPAP to ensure no residual  hypoxemia  Patient used small DreamWear fullface mask  I also recommend 30 day compliance download to assess the efficacy to the recommended pressure, measure leak, apnea hypopnea index and compliance for further outpatient monitoring and management of PAP therapy. In some cases alternative treatment options may be proven effective in resolving sleep apnea. These options include upper airway surgery, the use of a dental orthotic, or positional therapy. Clinical correlation is required. In general patients with sleep apnea are advised to avoid alcohol, sedatives and not to operate a motor vehicle while drowsy. Untreated sleep apnea increases the risk for cardiovascular and neurovascular disease.

## 2025-04-04 ENCOUNTER — HOSPITAL ENCOUNTER (OUTPATIENT)
Dept: RADIOLOGY | Facility: MEDICAL CENTER | Age: 70
End: 2025-04-04
Attending: FAMILY MEDICINE
Payer: MEDICARE

## 2025-04-04 DIAGNOSIS — Z78.0 MENOPAUSE: ICD-10-CM

## 2025-04-04 PROCEDURE — 77080 DXA BONE DENSITY AXIAL: CPT

## 2025-04-11 ENCOUNTER — OFFICE VISIT (OUTPATIENT)
Dept: SLEEP MEDICINE | Facility: MEDICAL CENTER | Age: 70
End: 2025-04-11
Attending: STUDENT IN AN ORGANIZED HEALTH CARE EDUCATION/TRAINING PROGRAM
Payer: MEDICARE

## 2025-04-11 VITALS
BODY MASS INDEX: 29.52 KG/M2 | OXYGEN SATURATION: 94 % | SYSTOLIC BLOOD PRESSURE: 116 MMHG | RESPIRATION RATE: 16 BRPM | HEART RATE: 90 BPM | HEIGHT: 63 IN | WEIGHT: 166.6 LBS | DIASTOLIC BLOOD PRESSURE: 62 MMHG

## 2025-04-11 DIAGNOSIS — G47.33 OSA (OBSTRUCTIVE SLEEP APNEA): ICD-10-CM

## 2025-04-11 PROCEDURE — 3078F DIAST BP <80 MM HG: CPT | Performed by: STUDENT IN AN ORGANIZED HEALTH CARE EDUCATION/TRAINING PROGRAM

## 2025-04-11 PROCEDURE — 99214 OFFICE O/P EST MOD 30 MIN: CPT | Performed by: STUDENT IN AN ORGANIZED HEALTH CARE EDUCATION/TRAINING PROGRAM

## 2025-04-11 PROCEDURE — 99212 OFFICE O/P EST SF 10 MIN: CPT | Performed by: STUDENT IN AN ORGANIZED HEALTH CARE EDUCATION/TRAINING PROGRAM

## 2025-04-11 PROCEDURE — 3074F SYST BP LT 130 MM HG: CPT | Performed by: STUDENT IN AN ORGANIZED HEALTH CARE EDUCATION/TRAINING PROGRAM

## 2025-04-11 ASSESSMENT — FIBROSIS 4 INDEX: FIB4 SCORE: 1.12

## 2025-07-11 ENCOUNTER — OFFICE VISIT (OUTPATIENT)
Dept: SLEEP MEDICINE | Facility: MEDICAL CENTER | Age: 70
End: 2025-07-11
Attending: STUDENT IN AN ORGANIZED HEALTH CARE EDUCATION/TRAINING PROGRAM
Payer: MEDICARE

## 2025-07-11 VITALS
BODY MASS INDEX: 30.12 KG/M2 | HEIGHT: 63 IN | OXYGEN SATURATION: 96 % | HEART RATE: 75 BPM | DIASTOLIC BLOOD PRESSURE: 72 MMHG | SYSTOLIC BLOOD PRESSURE: 122 MMHG | RESPIRATION RATE: 14 BRPM | WEIGHT: 170 LBS

## 2025-07-11 DIAGNOSIS — G47.33 OSA (OBSTRUCTIVE SLEEP APNEA): Primary | ICD-10-CM

## 2025-07-11 PROCEDURE — 3078F DIAST BP <80 MM HG: CPT | Performed by: STUDENT IN AN ORGANIZED HEALTH CARE EDUCATION/TRAINING PROGRAM

## 2025-07-11 PROCEDURE — 99214 OFFICE O/P EST MOD 30 MIN: CPT | Performed by: STUDENT IN AN ORGANIZED HEALTH CARE EDUCATION/TRAINING PROGRAM

## 2025-07-11 PROCEDURE — 3074F SYST BP LT 130 MM HG: CPT | Performed by: STUDENT IN AN ORGANIZED HEALTH CARE EDUCATION/TRAINING PROGRAM

## 2025-07-11 ASSESSMENT — FIBROSIS 4 INDEX: FIB4 SCORE: 1.12
